# Patient Record
Sex: FEMALE | Race: WHITE | NOT HISPANIC OR LATINO | Employment: UNEMPLOYED | URBAN - METROPOLITAN AREA
[De-identification: names, ages, dates, MRNs, and addresses within clinical notes are randomized per-mention and may not be internally consistent; named-entity substitution may affect disease eponyms.]

---

## 2017-10-05 ENCOUNTER — APPOINTMENT (OUTPATIENT)
Dept: SOCIAL WORK | Facility: CLINIC | Age: 27
End: 2017-10-05

## 2017-10-05 PROCEDURE — 90673 RIV3 VACCINE NO PRESERV IM: CPT | Performed by: REGISTERED NURSE

## 2019-06-24 DIAGNOSIS — Z01.812 PRE-OPERATIVE LABORATORY EXAMINATION: ICD-10-CM

## 2019-06-24 DIAGNOSIS — Z01.810 PRE-OPERATIVE CARDIOVASCULAR EXAMINATION: ICD-10-CM

## 2019-06-24 LAB
ANION GAP SERPL CALC-SCNC: 7 MMOL/L (ref 0–11.9)
BUN SERPL-MCNC: 11 MG/DL (ref 8–22)
CALCIUM SERPL-MCNC: 8.8 MG/DL (ref 8.5–10.5)
CHLORIDE SERPL-SCNC: 112 MMOL/L (ref 96–112)
CO2 SERPL-SCNC: 21 MMOL/L (ref 20–33)
CREAT SERPL-MCNC: 0.79 MG/DL (ref 0.5–1.4)
ERYTHROCYTE [DISTWIDTH] IN BLOOD BY AUTOMATED COUNT: 39.8 FL (ref 35.9–50)
GLUCOSE SERPL-MCNC: 113 MG/DL (ref 65–99)
HCT VFR BLD AUTO: 41.3 % (ref 37–47)
HGB BLD-MCNC: 13.1 G/DL (ref 12–16)
MCH RBC QN AUTO: 26.5 PG (ref 27–33)
MCHC RBC AUTO-ENTMCNC: 31.7 G/DL (ref 33.6–35)
MCV RBC AUTO: 83.4 FL (ref 81.4–97.8)
PLATELET # BLD AUTO: 267 K/UL (ref 164–446)
PMV BLD AUTO: 10.5 FL (ref 9–12.9)
POTASSIUM SERPL-SCNC: 3.8 MMOL/L (ref 3.6–5.5)
RBC # BLD AUTO: 4.95 M/UL (ref 4.2–5.4)
SODIUM SERPL-SCNC: 140 MMOL/L (ref 135–145)
WBC # BLD AUTO: 6.6 K/UL (ref 4.8–10.8)

## 2019-06-24 PROCEDURE — 93010 ELECTROCARDIOGRAM REPORT: CPT | Performed by: INTERNAL MEDICINE

## 2019-06-24 PROCEDURE — 93005 ELECTROCARDIOGRAM TRACING: CPT

## 2019-06-24 PROCEDURE — 84703 CHORIONIC GONADOTROPIN ASSAY: CPT

## 2019-06-24 PROCEDURE — 85027 COMPLETE CBC AUTOMATED: CPT

## 2019-06-24 PROCEDURE — 80048 BASIC METABOLIC PNL TOTAL CA: CPT

## 2019-06-24 PROCEDURE — 36415 COLL VENOUS BLD VENIPUNCTURE: CPT

## 2019-06-24 RX ORDER — OMEPRAZOLE 20 MG/1
20 CAPSULE, DELAYED RELEASE ORAL EVERY MORNING
COMMUNITY

## 2019-06-24 RX ORDER — LOSARTAN POTASSIUM AND HYDROCHLOROTHIAZIDE 12.5; 5 MG/1; MG/1
1 TABLET ORAL EVERY MORNING
Status: ON HOLD | COMMUNITY
End: 2020-02-19

## 2019-06-24 RX ORDER — ATENOLOL 25 MG/1
25 TABLET ORAL
COMMUNITY

## 2019-06-24 RX ORDER — CETIRIZINE HYDROCHLORIDE 10 MG/1
10 TABLET ORAL
COMMUNITY

## 2019-06-24 NOTE — OR NURSING
"Pre-admit appointment completed. \"Preparing for your procedure\" sheet given to pt along with verbal and written instructions. Pt instructed to continue regularly prescribed medications through the day before surgery. Pt instructed to take the following medications the day of surgery with a sip of water, per anesthesia protocol; prilosec and topiramate.    Dr Lundberg notified via e-mail of procedure due to BMI=54.88 and other co-morbidities.  "

## 2019-06-25 LAB
EKG IMPRESSION: NORMAL
HCG SERPL QL: NEGATIVE

## 2019-06-25 NOTE — OR NURSING
Dr Lundberg's response to BMI notification;  Patient is at risk for perioperatively pulmonary issues given extreme BMI.  As always, it is up to the assigned anesthesiologist to decide whether to proceed or not in this case after reviewing the clinical picture.  Thank you.     Edilberto Lundberg M.D.  [facility]

## 2019-06-26 ENCOUNTER — ANESTHESIA (OUTPATIENT)
Dept: SURGERY | Facility: MEDICAL CENTER | Age: 29
End: 2019-06-26
Payer: MEDICAID

## 2019-06-26 ENCOUNTER — ANESTHESIA EVENT (OUTPATIENT)
Dept: SURGERY | Facility: MEDICAL CENTER | Age: 29
End: 2019-06-26
Payer: MEDICAID

## 2019-06-26 ENCOUNTER — HOSPITAL ENCOUNTER (OUTPATIENT)
Facility: MEDICAL CENTER | Age: 29
End: 2019-06-26
Attending: INTERNAL MEDICINE | Admitting: INTERNAL MEDICINE
Payer: MEDICAID

## 2019-06-26 VITALS
SYSTOLIC BLOOD PRESSURE: 115 MMHG | OXYGEN SATURATION: 98 % | HEART RATE: 82 BPM | RESPIRATION RATE: 16 BRPM | TEMPERATURE: 97.7 F | DIASTOLIC BLOOD PRESSURE: 65 MMHG | WEIGHT: 293 LBS | BODY MASS INDEX: 41.95 KG/M2 | HEIGHT: 70 IN

## 2019-06-26 LAB
HCG UR QL: NEGATIVE
PATHOLOGY CONSULT NOTE: NORMAL

## 2019-06-26 PROCEDURE — 160025 RECOVERY II MINUTES (STATS): Performed by: INTERNAL MEDICINE

## 2019-06-26 PROCEDURE — 160046 HCHG PACU - 1ST 60 MINS PHASE II: Performed by: INTERNAL MEDICINE

## 2019-06-26 PROCEDURE — 160002 HCHG RECOVERY MINUTES (STAT): Performed by: INTERNAL MEDICINE

## 2019-06-26 PROCEDURE — 700105 HCHG RX REV CODE 258: Performed by: INTERNAL MEDICINE

## 2019-06-26 PROCEDURE — 502240 HCHG MISC OR SUPPLY RC 0272: Performed by: INTERNAL MEDICINE

## 2019-06-26 PROCEDURE — 88305 TISSUE EXAM BY PATHOLOGIST: CPT | Mod: 59

## 2019-06-26 PROCEDURE — 81025 URINE PREGNANCY TEST: CPT

## 2019-06-26 PROCEDURE — 700101 HCHG RX REV CODE 250: Performed by: ANESTHESIOLOGY

## 2019-06-26 PROCEDURE — 700111 HCHG RX REV CODE 636 W/ 250 OVERRIDE (IP): Performed by: ANESTHESIOLOGY

## 2019-06-26 PROCEDURE — 160009 HCHG ANES TIME/MIN: Performed by: INTERNAL MEDICINE

## 2019-06-26 PROCEDURE — 160204 HCHG ENDO MINUTES - 1ST 30 MINS LEVEL 5: Performed by: INTERNAL MEDICINE

## 2019-06-26 PROCEDURE — 160048 HCHG OR STATISTICAL LEVEL 1-5: Performed by: INTERNAL MEDICINE

## 2019-06-26 PROCEDURE — 160035 HCHG PACU - 1ST 60 MINS PHASE I: Performed by: INTERNAL MEDICINE

## 2019-06-26 PROCEDURE — 503369 HCHG GOLDPROBE, 7 FR: Performed by: INTERNAL MEDICINE

## 2019-06-26 RX ORDER — HALOPERIDOL 5 MG/ML
1 INJECTION INTRAMUSCULAR
Status: DISCONTINUED | OUTPATIENT
Start: 2019-06-26 | End: 2019-06-26 | Stop reason: HOSPADM

## 2019-06-26 RX ORDER — HYDROMORPHONE HYDROCHLORIDE 1 MG/ML
0.4 INJECTION, SOLUTION INTRAMUSCULAR; INTRAVENOUS; SUBCUTANEOUS
Status: DISCONTINUED | OUTPATIENT
Start: 2019-06-26 | End: 2019-06-26 | Stop reason: HOSPADM

## 2019-06-26 RX ORDER — HYDROMORPHONE HYDROCHLORIDE 1 MG/ML
0.1 INJECTION, SOLUTION INTRAMUSCULAR; INTRAVENOUS; SUBCUTANEOUS
Status: DISCONTINUED | OUTPATIENT
Start: 2019-06-26 | End: 2019-06-26 | Stop reason: HOSPADM

## 2019-06-26 RX ORDER — MIDAZOLAM HYDROCHLORIDE 1 MG/ML
1 INJECTION INTRAMUSCULAR; INTRAVENOUS
Status: DISCONTINUED | OUTPATIENT
Start: 2019-06-26 | End: 2019-06-26 | Stop reason: HOSPADM

## 2019-06-26 RX ORDER — MEPERIDINE HYDROCHLORIDE 25 MG/ML
12.5 INJECTION INTRAMUSCULAR; INTRAVENOUS; SUBCUTANEOUS
Status: DISCONTINUED | OUTPATIENT
Start: 2019-06-26 | End: 2019-06-26 | Stop reason: HOSPADM

## 2019-06-26 RX ORDER — METOPROLOL TARTRATE 1 MG/ML
1 INJECTION, SOLUTION INTRAVENOUS
Status: DISCONTINUED | OUTPATIENT
Start: 2019-06-26 | End: 2019-06-26 | Stop reason: HOSPADM

## 2019-06-26 RX ORDER — SODIUM CHLORIDE, SODIUM LACTATE, POTASSIUM CHLORIDE, CALCIUM CHLORIDE 600; 310; 30; 20 MG/100ML; MG/100ML; MG/100ML; MG/100ML
INJECTION, SOLUTION INTRAVENOUS CONTINUOUS
Status: DISCONTINUED | OUTPATIENT
Start: 2019-06-26 | End: 2019-06-26 | Stop reason: HOSPADM

## 2019-06-26 RX ORDER — DIPHENHYDRAMINE HYDROCHLORIDE 50 MG/ML
12.5 INJECTION INTRAMUSCULAR; INTRAVENOUS
Status: DISCONTINUED | OUTPATIENT
Start: 2019-06-26 | End: 2019-06-26 | Stop reason: HOSPADM

## 2019-06-26 RX ORDER — HYDRALAZINE HYDROCHLORIDE 20 MG/ML
10 INJECTION INTRAMUSCULAR; INTRAVENOUS
Status: DISCONTINUED | OUTPATIENT
Start: 2019-06-26 | End: 2019-06-26 | Stop reason: HOSPADM

## 2019-06-26 RX ORDER — HYDROMORPHONE HYDROCHLORIDE 1 MG/ML
0.2 INJECTION, SOLUTION INTRAMUSCULAR; INTRAVENOUS; SUBCUTANEOUS
Status: DISCONTINUED | OUTPATIENT
Start: 2019-06-26 | End: 2019-06-26 | Stop reason: HOSPADM

## 2019-06-26 RX ADMIN — PROPOFOL 20 MG: 10 INJECTION, EMULSION INTRAVENOUS at 09:51

## 2019-06-26 RX ADMIN — SODIUM CHLORIDE, POTASSIUM CHLORIDE, SODIUM LACTATE AND CALCIUM CHLORIDE: 600; 310; 30; 20 INJECTION, SOLUTION INTRAVENOUS at 08:20

## 2019-06-26 RX ADMIN — PROPOFOL 40 MG: 10 INJECTION, EMULSION INTRAVENOUS at 09:41

## 2019-06-26 RX ADMIN — PROPOFOL 20 MG: 10 INJECTION, EMULSION INTRAVENOUS at 09:50

## 2019-06-26 RX ADMIN — PROPOFOL 20 MG: 10 INJECTION, EMULSION INTRAVENOUS at 09:49

## 2019-06-26 RX ADMIN — PROPOFOL 40 MG: 10 INJECTION, EMULSION INTRAVENOUS at 09:44

## 2019-06-26 RX ADMIN — MIDAZOLAM HYDROCHLORIDE 2 MG: 1 INJECTION, SOLUTION INTRAMUSCULAR; INTRAVENOUS at 09:29

## 2019-06-26 RX ADMIN — PROPOFOL 20 MG: 10 INJECTION, EMULSION INTRAVENOUS at 09:47

## 2019-06-26 RX ADMIN — LIDOCAINE HYDROCHLORIDE 100 MG: 20 INJECTION, SOLUTION INFILTRATION; PERINEURAL at 09:41

## 2019-06-26 RX ADMIN — FENTANYL CITRATE 100 MCG: 50 INJECTION, SOLUTION INTRAMUSCULAR; INTRAVENOUS at 09:40

## 2019-06-26 RX ADMIN — SODIUM CHLORIDE, POTASSIUM CHLORIDE, SODIUM LACTATE AND CALCIUM CHLORIDE: 600; 310; 30; 20 INJECTION, SOLUTION INTRAVENOUS at 09:40

## 2019-06-26 RX ADMIN — PROPOFOL 20 MG: 10 INJECTION, EMULSION INTRAVENOUS at 09:40

## 2019-06-26 ASSESSMENT — PAIN SCALES - GENERAL: PAIN_LEVEL: 0

## 2019-06-26 NOTE — DISCHARGE INSTRUCTIONS
COLONOSCOPY OR FLEXIBLE SIGMOIDOSCOPY  1. If you received a barium enema, take a mild laxative such as dulcolax to clean out the barium.   2. Drink plenty of fluids. Eat a diet high in fiber; such foods as whole-grain breads, fresh fruit and vegetables, nuts are recommended.  3. You may notice a few drops of blood with your first bowel movement. If you develop any large amount of bleeding, black stools, a fever, or abdominal pain, call your doctor right away.   4. Call your doctor for test results in 5 days.  5. Don't drive or drink alcohol for 24 hours. The medication you received will make you too drowsy.   6. No heavy lifting, ASA products or ASA x 5 days   7. Additional instructions: Biopsies taken, Hemorrhoids found during procedure. To follow up with Dr. Hernandez as previously noted  8. Prescriptions: none    Discharge time: 10:00 am to recovery room    You should call 911 if you develop problems with breathing or chest pain.    Nurse's Signature: ___________________________________    I acknowledge receipt and understanding of these Home Care instructions.      Colonoscopy, Adult  A colonoscopy is an exam to look at the large intestine. It is done to check for problems, such as:  · Lumps (tumors).  · Growths (polyps).  · Swelling (inflammation).  · Bleeding.  What happens before the procedure?  Eating and drinking  Follow instructions from your doctor about eating and drinking. These instructions may include:  · A few days before the procedure - follow a low-fiber diet.  ¨ Avoid nuts.  ¨ Avoid seeds.  ¨ Avoid dried fruit.  ¨ Avoid raw fruits.  ¨ Avoid vegetables.  · 1-3 days before the procedure - follow a clear liquid diet. Avoid liquids that have red or purple dye. Drink only clear liquids, such as:  ¨ Clear broth or bouillon.  ¨ Black coffee or tea.  ¨ Clear juice.  ¨ Clear soft drinks or sports drinks.  ¨ Gelatin desert.  ¨ Popsicles.  · On the day of the procedure - do not eat or drink anything during the  2 hours before the procedure.  Bowel prep  If you were prescribed an oral bowel prep:  · Take it as told by your doctor. Starting the day before your procedure, you will need to drink a lot of liquid. The liquid will cause you to poop (have bowel movements) until your poop is almost clear or light green.  · If your skin or butt gets irritated from diarrhea, you may:  ¨ Wipe the area with wipes that have medicine in them, such as adult wet wipes with aloe and vitamin E.  ¨ Put something on your skin that soothes the area, such as petroleum jelly.  · If you throw up (vomit) while drinking the bowel prep, take a break for up to 60 minutes. Then begin the bowel prep again. If you keep throwing up and you cannot take the bowel prep without throwing up, call your doctor.  General instructions  · Ask your doctor about changing or stopping your normal medicines. This is important if you take diabetes medicines or blood thinners.  · Plan to have someone take you home from the hospital or clinic.  What happens during the procedure?  · An IV tube may be put into one of your veins.  · You will be given medicine to help you relax (sedative).  · To reduce your risk of infection:  ¨ Your doctors will wash their hands.  ¨ Your anal area will be washed with soap.  · You will be asked to lie on your side with your knees bent.  · Your doctor will get a long, thin, flexible tube ready. The tube will have a camera and a light on the end.  · The tube will be put into your anus.  · The tube will be gently put into your large intestine.  · Air will be delivered into your large intestine to keep it open. You may feel some pressure or cramping.  · The camera will be used to take photos.  · A small tissue sample may be removed from your body to be looked at under a microscope (biopsy). If any possible problems are found, the tissue will be sent to a lab for testing.  · If small growths are found, your doctor may remove them and have them  checked for cancer.  · The tube that was put into your anus will be slowly removed.  The procedure may vary among doctors and hospitals.  What happens after the procedure?  · Your doctor will check on you often until the medicines you were given have worn off.  · Do not drive for 24 hours after the procedure.  · You may have a small amount of blood in your poop.  · You may pass gas.  · You may have mild cramps or bloating in your belly (abdomen).  · It is up to you to get the results of your procedure. Ask your doctor, or the department performing the procedure, when your results will be ready.  This information is not intended to replace advice given to you by your health care provider. Make sure you discuss any questions you have with your health care provider.  Document Released: 01/20/2012 Document Revised: 08/24/2017 Document Reviewed: 02/28/2017  Elsevier Interactive Patient Education © 2017 [employer]

## 2019-06-26 NOTE — PROCEDURES
Pre-procedure Diagnoses:   Change in bowel habits [R19.4]   Chronic constipation [K59.09]   Functional diarrhea [K59.1]     Post-procedure Diagnoses:   Grade I internal hemorrhoids [K64.0]     Procedures:   COLONOSCOPY WITH BIOPSIES [43728 (CPT®)]     Endoscopist: Be Gould MD, [facility], [facility] [place]    Monitored anesthesia care: Dr. Hood Childress    Consent: Risks, benefits, and alternatives of aforementioned procedures were discussed with patient in detal.  Consenting person was given opportunities to ask questions and discuss other options.  Risks including but not limited to perforation, infection, bleeding, missed lesion(s), possible need for surgery and/or interventional radiology, possible need for repeat procedures and/or additional testing, hospitalization possibly prolonged, cardiac and/or pulmonary event (I quoted patient a higher risk than usual due to morbid obesity), aspiration, hypoxia, stroke, medication and/or anesthesia reaction, indefinite diagnosis, discomfort, unsuccessful and/or incomplete procedure, ineffective therapy and/or persistent symptoms, damage to adjacent organs and/or vascular structures, and other adverse events possibly life-threatening.  Interactive discussion was undertaken with Layman's terms.  I answered questions in full and to satisfaction.  Consenting person stated understanding and acceptance of these risks, and wished to proceed.  Informed consent was given in clear state of mind.    Endoscopic procedures in detail:  Adult long-variable stiffness colonoscopy was inserted into anus and rectum, retroflexion was performed in rectum, colonoscope was advanced to cecum, appendiceal orifice and ileocecal valve were identified, terminal ileum was intubated and inspected, water flush was used to wash mucosa and suction of colonic fluid contents was performed to optimize visualization.  I performed photodocumentation of the terminal ileum, cecum, appendical orifice, ileocecal value,  retroflexion view in rectum and anus channel. Terminal ileum and random colon biopsies were obtained and sent to pathology in separate containers.  There was suction of insufflated air and fluid contents upon removal.     Procedure times:  - In-room 09:40  - Start 09:45  - Cecal: 09:46  - Completed 09:53  - Out of room per nursing records    Colonoscopy Findings:  - Bowel preparation: excellent  - Terminal ileum: endoscopically unremarkable.  - Colon: endoscopically unremarkable.  - Rectum: small non-bleeding grade 1 internal hemorrhoids.    Impression:  1. Internal hemorrhoids, grade 1  2. Terminal ileum and random colon biopsies obtained.    Recommendations:   1.  Routine post-endoscopy anesthesia recovery care.  Discharge patient when she is awake, alert and comfortable, when recovery criteria are met.  Aspiration and fall precautions x 24 hours.   2. High-fiber diet.  3. Follow-up with established ARLET Hernandez NP as scheduled 7/3/2019.  4. I spoke to patient, father (Vivek) and recovery nurse about impression, diagnosis and recommendations.

## 2019-06-26 NOTE — ANESTHESIA PREPROCEDURE EVALUATION
Relevant Problems   (+) AR (allergic rhinitis)   (+) Anxiety   (+) BMI 50.0-59.9, adult (HCC)   (+) Hypertension   (+) Migraine headache   (+) Sacral fracture (HCC)   (+) Thoracic vertebral fracture (HCC)       Physical Exam    Airway   Mallampati: II  TM distance: >3 FB  Neck ROM: full       Cardiovascular - normal exam  Rhythm: regular  Rate: normal  (-) murmur     Dental - normal exam         Pulmonary - normal exam  Breath sounds clear to auscultation     Abdominal    Neurological - normal exam                 Anesthesia Plan    ASA 3   ASA physical status 3 criteria: morbid obesity - BMI greater than or equal to 40    Plan - MAC             Induction: intravenous      Pertinent diagnostic labs and testing reviewed    Informed Consent:    Anesthetic plan and risks discussed with patient.    Use of blood products discussed with: patient whom consented to blood products.

## 2019-06-26 NOTE — OR NURSING
0808 into pre op educated on pre op procedures and events for this day's schedule, pt unable to urinate at this time.   0840 up to void attempt, father in to sit with pt after she voids

## 2019-06-26 NOTE — ANESTHESIA POSTPROCEDURE EVALUATION
Patient: Rina Maher    Procedure Summary     Date:  06/26/19 Room / Location:   ENDOSCOPIC ULTRASOUND ROOM / SURGERY [facility]    Anesthesia Start:  0940 Anesthesia Stop:  1001    Procedure:  COLONOSCOPY Diagnosis:       Change in bowel habits      (CHANGE IN BOWEL HABITS)    Surgeon:  Be Gould M.D. Responsible Provider:  Hood Childress M.D.    Anesthesia Type:  MAC ASA Status:  3          Final Anesthesia Type: MAC  Last vitals  BP   Blood Pressure: 136/90    Temp        Pulse   Pulse: 82   Resp   18    SpO2   98 %      Anesthesia Post Evaluation    Patient location during evaluation: PACU  Patient participation: complete - patient participated  Level of consciousness: awake and alert  Pain score: 0    Airway patency: patent  Anesthetic complications: no  Cardiovascular status: hemodynamically stable  Respiratory status: acceptable  Hydration status: euvolemic    PONV: none           Nurse Pain Score: 0 (NPRS)

## 2019-06-26 NOTE — PROGRESS NOTES
Patient seen and examined but proceeding with colonoscopy.  Risks, benefits, and alternatives of aforementioned procedures were discussed with patient in detail.  Consenting person was given opportunities to ask questions and discuss other options.  Risks including but not limited to perforation, infection, bleeding, missed lesion(s), possible need for surgery and/or interventional radiology, possible need for repeat procedures and/or additional testing, hospitalization possibly prolonged, cardiac and/or pulmonary event, aspiration, hypoxia, stroke, medication and/or anesthesia reaction, indefinite diagnosis, discomfort, unsuccessful and/or incomplete procedure, ineffective therapy and/or persistent symptoms, damage to adjacent organs and/or vascular structures, and other adverse events possibly life-threatening.  Interactive discussion was undertaken with Layman's terms.  I answered questions in full and to satisfaction.  Consenting person stated understanding and acceptance of these risks, and wished to proceed.  Informed consent was given in clear state of mind.

## 2019-06-26 NOTE — ANESTHESIA QCDR
2019 Qualified Clinical Data Registry (for Quality Improvement)     Postoperative nausea/vomiting risk protocol (Adult = 18 yrs and Pediatric 3-17 yrs)- (430 and 463)  General inhalation anesthetic (NOT TIVA) with PONV risk factors: No  Provision of anti-emetic therapy with at least 2 different classes of agents: N/A  Patient DID NOT receive anti-emetic therapy and reason is documented in Medical Record: N/A    Multimodal Pain Management- (AQI59)  Patient undergoing Elective Surgery (i.e. Outpatient, or ASC, or Prescheduled Surgery prior to Hospital Admission): No  Use of Multimodal Pain Management, two or more drugs and/or interventions, NOT including systemic opioids: N/A  Exception: Documented allergy to multiple classes of analgesics: N/A    PACU assessment of acute postoperative pain prior to Anesthesia Care End- Applies to Patients Age = 18- (ABG7)  Initial PACU pain score is which of the following: < 7/10  Patient unable to report pain score: N/A    Post-anesthetic transfer of care checklist/protocol to PACU/ICU- (426 and 427)  Upon conclusion of case, patient transferred to which of the following locations:   Use of transfer checklist/protocol: Yes  Exclusion: Service Performed in Patient Hospital Room (and thus did not require transfer): N/A    PACU Reintubation- (AQI31)  General anesthesia requiring endotracheal intubation (ETT) along with subsequent extubation in OR or PACU: No  Required reintubation in the PACU: N/A  Extubation was a planned trial documented in the medical record prior to removal of the original airway device: N/A    Unplanned admission to ICU related to anesthesia service up through end of PACU care- (MD51)  Unplanned admission to ICU (not initially anticipated at anesthesia start time): No

## 2019-06-26 NOTE — ANESTHESIA TIME REPORT
Anesthesia Start and Stop Event Times     Date Time Event    6/26/2019 0940 Anesthesia Start     1001 Anesthesia Stop        Responsible Staff  06/26/19    Name Role Begin End    Hood Childress M.D. Anesth 0940 1001        Preop Diagnosis (Free Text):  Pre-op Diagnosis     CHANGE IN BOWEL HABITS        Preop Diagnosis (Codes):  Diagnosis Information     Diagnosis Code(s): Change in bowel habits [R19.4]        Post op Diagnosis  Encounter for screening colonoscopy      Premium Reason  Non-Premium    Comments:

## 2019-06-26 NOTE — OR NURSING
1000- Patient admitted to PACU from the recovery room. No distress noted at this time. Patient resting in bed. Respirations noted as even and unlabored. Patient resting in bed.     1015- Patient assisted with repositioning in bed. Patient given lemon lime soda per patient request. Patient denies pain or nausea. Patient states of discomfort.     1030- Patient resting in bed and denies the need for pain or nausea intervention at this time.     1043- Report called to stage II area. Patient ready for transfer.

## 2019-08-27 ENCOUNTER — HOSPITAL ENCOUNTER (OUTPATIENT)
Dept: RADIOLOGY | Facility: MEDICAL CENTER | Age: 29
End: 2019-08-27
Attending: FAMILY MEDICINE
Payer: MEDICAID

## 2019-08-27 DIAGNOSIS — M54.5 LOW BACK PAIN, UNSPECIFIED BACK PAIN LATERALITY, UNSPECIFIED CHRONICITY, WITH SCIATICA PRESENCE UNSPECIFIED: ICD-10-CM

## 2019-08-27 DIAGNOSIS — M54.16 LUMBAR RADICULOPATHY: ICD-10-CM

## 2019-08-27 PROCEDURE — 72148 MRI LUMBAR SPINE W/O DYE: CPT

## 2020-02-13 ENCOUNTER — HOSPITAL ENCOUNTER (OUTPATIENT)
Dept: RADIOLOGY | Facility: MEDICAL CENTER | Age: 30
DRG: 621 | End: 2020-02-13
Attending: COLON & RECTAL SURGERY
Payer: MEDICAID

## 2020-02-13 DIAGNOSIS — Z01.811 PRE-OPERATIVE RESPIRATORY EXAMINATION: ICD-10-CM

## 2020-02-13 DIAGNOSIS — Z01.812 PRE-OPERATIVE LABORATORY EXAMINATION: ICD-10-CM

## 2020-02-13 DIAGNOSIS — Z01.810 PRE-OPERATIVE CARDIOVASCULAR EXAMINATION: ICD-10-CM

## 2020-02-13 LAB
ALBUMIN SERPL BCP-MCNC: 4.4 G/DL (ref 3.2–4.9)
ALBUMIN/GLOB SERPL: 1.3 G/DL
ALP SERPL-CCNC: 69 U/L (ref 30–99)
ALT SERPL-CCNC: 88 U/L (ref 2–50)
ANION GAP SERPL CALC-SCNC: 12 MMOL/L (ref 0–11.9)
AST SERPL-CCNC: 38 U/L (ref 12–45)
BASOPHILS # BLD AUTO: 0.4 % (ref 0–1.8)
BASOPHILS # BLD: 0.03 K/UL (ref 0–0.12)
BILIRUB SERPL-MCNC: 0.5 MG/DL (ref 0.1–1.5)
BUN SERPL-MCNC: 17 MG/DL (ref 8–22)
CALCIUM SERPL-MCNC: 9.6 MG/DL (ref 8.5–10.5)
CHLORIDE SERPL-SCNC: 108 MMOL/L (ref 96–112)
CO2 SERPL-SCNC: 20 MMOL/L (ref 20–33)
CREAT SERPL-MCNC: 0.77 MG/DL (ref 0.5–1.4)
EKG IMPRESSION: NORMAL
EOSINOPHIL # BLD AUTO: 0.08 K/UL (ref 0–0.51)
EOSINOPHIL NFR BLD: 1 % (ref 0–6.9)
ERYTHROCYTE [DISTWIDTH] IN BLOOD BY AUTOMATED COUNT: 38.7 FL (ref 35.9–50)
EST. AVERAGE GLUCOSE BLD GHB EST-MCNC: 100 MG/DL
GLOBULIN SER CALC-MCNC: 3.3 G/DL (ref 1.9–3.5)
GLUCOSE SERPL-MCNC: 119 MG/DL (ref 65–99)
HBA1C MFR BLD: 5.1 % (ref 0–5.6)
HCT VFR BLD AUTO: 44.6 % (ref 37–47)
HGB BLD-MCNC: 15.3 G/DL (ref 12–16)
IMM GRANULOCYTES # BLD AUTO: 0.03 K/UL (ref 0–0.11)
IMM GRANULOCYTES NFR BLD AUTO: 0.4 % (ref 0–0.9)
INR PPP: 0.98 (ref 0.87–1.13)
LYMPHOCYTES # BLD AUTO: 1.64 K/UL (ref 1–4.8)
LYMPHOCYTES NFR BLD: 21.5 % (ref 22–41)
MCH RBC QN AUTO: 28.3 PG (ref 27–33)
MCHC RBC AUTO-ENTMCNC: 34.3 G/DL (ref 33.6–35)
MCV RBC AUTO: 82.6 FL (ref 81.4–97.8)
MONOCYTES # BLD AUTO: 0.65 K/UL (ref 0–0.85)
MONOCYTES NFR BLD AUTO: 8.5 % (ref 0–13.4)
NEUTROPHILS # BLD AUTO: 5.21 K/UL (ref 2–7.15)
NEUTROPHILS NFR BLD: 68.2 % (ref 44–72)
NRBC # BLD AUTO: 0 K/UL
NRBC BLD-RTO: 0 /100 WBC
PLATELET # BLD AUTO: 296 K/UL (ref 164–446)
PMV BLD AUTO: 10.8 FL (ref 9–12.9)
POTASSIUM SERPL-SCNC: 3.3 MMOL/L (ref 3.6–5.5)
PROT SERPL-MCNC: 7.7 G/DL (ref 6–8.2)
PROTHROMBIN TIME: 13.2 SEC (ref 12–14.6)
RBC # BLD AUTO: 5.4 M/UL (ref 4.2–5.4)
SODIUM SERPL-SCNC: 140 MMOL/L (ref 135–145)
WBC # BLD AUTO: 7.6 K/UL (ref 4.8–10.8)

## 2020-02-13 PROCEDURE — 85025 COMPLETE CBC W/AUTO DIFF WBC: CPT

## 2020-02-13 PROCEDURE — 93005 ELECTROCARDIOGRAM TRACING: CPT

## 2020-02-13 PROCEDURE — 36415 COLL VENOUS BLD VENIPUNCTURE: CPT

## 2020-02-13 PROCEDURE — 80053 COMPREHEN METABOLIC PANEL: CPT

## 2020-02-13 PROCEDURE — 83036 HEMOGLOBIN GLYCOSYLATED A1C: CPT

## 2020-02-13 PROCEDURE — 93010 ELECTROCARDIOGRAM REPORT: CPT | Performed by: INTERNAL MEDICINE

## 2020-02-13 PROCEDURE — 71045 X-RAY EXAM CHEST 1 VIEW: CPT

## 2020-02-13 PROCEDURE — 85610 PROTHROMBIN TIME: CPT

## 2020-02-13 RX ORDER — SERTRALINE HYDROCHLORIDE 100 MG/1
200 TABLET, FILM COATED ORAL
COMMUNITY

## 2020-02-13 RX ORDER — TOPIRAMATE 100 MG/1
50-100 CAPSULE, EXTENDED RELEASE ORAL 2 TIMES DAILY
Status: ON HOLD | COMMUNITY
End: 2020-02-19

## 2020-02-13 RX ORDER — FLUTICASONE PROPIONATE 50 MCG
1 SPRAY, SUSPENSION (ML) NASAL
COMMUNITY

## 2020-02-19 ENCOUNTER — HOSPITAL ENCOUNTER (INPATIENT)
Facility: MEDICAL CENTER | Age: 30
LOS: 1 days | DRG: 621 | End: 2020-02-20
Attending: COLON & RECTAL SURGERY | Admitting: COLON & RECTAL SURGERY
Payer: MEDICAID

## 2020-02-19 ENCOUNTER — ANESTHESIA EVENT (OUTPATIENT)
Dept: SURGERY | Facility: MEDICAL CENTER | Age: 30
DRG: 621 | End: 2020-02-19
Payer: MEDICAID

## 2020-02-19 ENCOUNTER — ANESTHESIA (OUTPATIENT)
Dept: SURGERY | Facility: MEDICAL CENTER | Age: 30
DRG: 621 | End: 2020-02-19
Payer: MEDICAID

## 2020-02-19 LAB
HCG SERPL QL: NEGATIVE
POTASSIUM SERPL-SCNC: 3.2 MMOL/L (ref 3.6–5.5)

## 2020-02-19 PROCEDURE — 501583 HCHG TROCAR, THRD CAN&SEAL 5X100: Performed by: COLON & RECTAL SURGERY

## 2020-02-19 PROCEDURE — 500448 HCHG DRESSING, TELFA 3X4: Performed by: COLON & RECTAL SURGERY

## 2020-02-19 PROCEDURE — 700101 HCHG RX REV CODE 250: Performed by: ANESTHESIOLOGY

## 2020-02-19 PROCEDURE — A9270 NON-COVERED ITEM OR SERVICE: HCPCS | Performed by: NURSE PRACTITIONER

## 2020-02-19 PROCEDURE — 160048 HCHG OR STATISTICAL LEVEL 1-5: Performed by: COLON & RECTAL SURGERY

## 2020-02-19 PROCEDURE — 84132 ASSAY OF SERUM POTASSIUM: CPT

## 2020-02-19 PROCEDURE — 700111 HCHG RX REV CODE 636 W/ 250 OVERRIDE (IP): Performed by: ANESTHESIOLOGY

## 2020-02-19 PROCEDURE — 160035 HCHG PACU - 1ST 60 MINS PHASE I: Performed by: COLON & RECTAL SURGERY

## 2020-02-19 PROCEDURE — 502570 HCHG PACK, GASTRIC BANDING: Performed by: COLON & RECTAL SURGERY

## 2020-02-19 PROCEDURE — 501399 HCHG SPECIMAN BAG, ENDO CATC: Performed by: COLON & RECTAL SURGERY

## 2020-02-19 PROCEDURE — 88307 TISSUE EXAM BY PATHOLOGIST: CPT

## 2020-02-19 PROCEDURE — 501338 HCHG SHEARS, ENDO: Performed by: COLON & RECTAL SURGERY

## 2020-02-19 PROCEDURE — 0DB64Z3 EXCISION OF STOMACH, PERCUTANEOUS ENDOSCOPIC APPROACH, VERTICAL: ICD-10-PCS | Performed by: COLON & RECTAL SURGERY

## 2020-02-19 PROCEDURE — 160002 HCHG RECOVERY MINUTES (STAT): Performed by: COLON & RECTAL SURGERY

## 2020-02-19 PROCEDURE — 770006 HCHG ROOM/CARE - MED/SURG/GYN SEMI*

## 2020-02-19 PROCEDURE — 700102 HCHG RX REV CODE 250 W/ 637 OVERRIDE(OP): Performed by: COLON & RECTAL SURGERY

## 2020-02-19 PROCEDURE — 700111 HCHG RX REV CODE 636 W/ 250 OVERRIDE (IP): Performed by: COLON & RECTAL SURGERY

## 2020-02-19 PROCEDURE — 0FB24ZX EXCISION OF LEFT LOBE LIVER, PERCUTANEOUS ENDOSCOPIC APPROACH, DIAGNOSTIC: ICD-10-PCS | Performed by: COLON & RECTAL SURGERY

## 2020-02-19 PROCEDURE — A9270 NON-COVERED ITEM OR SERVICE: HCPCS | Performed by: COLON & RECTAL SURGERY

## 2020-02-19 PROCEDURE — 88313 SPECIAL STAINS GROUP 2: CPT

## 2020-02-19 PROCEDURE — 160041 HCHG SURGERY MINUTES - EA ADDL 1 MIN LEVEL 4: Performed by: COLON & RECTAL SURGERY

## 2020-02-19 PROCEDURE — 700102 HCHG RX REV CODE 250 W/ 637 OVERRIDE(OP): Performed by: NURSE PRACTITIONER

## 2020-02-19 PROCEDURE — 700111 HCHG RX REV CODE 636 W/ 250 OVERRIDE (IP): Performed by: NURSE PRACTITIONER

## 2020-02-19 PROCEDURE — 700105 HCHG RX REV CODE 258: Performed by: NURSE PRACTITIONER

## 2020-02-19 PROCEDURE — 88305 TISSUE EXAM BY PATHOLOGIST: CPT

## 2020-02-19 PROCEDURE — 84703 CHORIONIC GONADOTROPIN ASSAY: CPT

## 2020-02-19 PROCEDURE — 160036 HCHG PACU - EA ADDL 30 MINS PHASE I: Performed by: COLON & RECTAL SURGERY

## 2020-02-19 PROCEDURE — 700111 HCHG RX REV CODE 636 W/ 250 OVERRIDE (IP)

## 2020-02-19 PROCEDURE — 700101 HCHG RX REV CODE 250: Performed by: COLON & RECTAL SURGERY

## 2020-02-19 PROCEDURE — 160009 HCHG ANES TIME/MIN: Performed by: COLON & RECTAL SURGERY

## 2020-02-19 PROCEDURE — 160029 HCHG SURGERY MINUTES - 1ST 30 MINS LEVEL 4: Performed by: COLON & RECTAL SURGERY

## 2020-02-19 PROCEDURE — 501838 HCHG SUTURE GENERAL: Performed by: COLON & RECTAL SURGERY

## 2020-02-19 PROCEDURE — 501497 HCHG SURGICLIP: Performed by: COLON & RECTAL SURGERY

## 2020-02-19 PROCEDURE — 700105 HCHG RX REV CODE 258: Performed by: COLON & RECTAL SURGERY

## 2020-02-19 PROCEDURE — 501570 HCHG TROCAR, SEPARATOR: Performed by: COLON & RECTAL SURGERY

## 2020-02-19 PROCEDURE — 502240 HCHG MISC OR SUPPLY RC 0272: Performed by: COLON & RECTAL SURGERY

## 2020-02-19 RX ORDER — PROMETHAZINE HYDROCHLORIDE 12.5 MG/1
TABLET ORAL
COMMUNITY
Start: 2020-02-12 | End: 2023-06-20

## 2020-02-19 RX ORDER — TOPIRAMATE 25 MG/1
50 TABLET ORAL EVERY EVENING
Status: DISCONTINUED | OUTPATIENT
Start: 2020-02-20 | End: 2020-02-20 | Stop reason: HOSPADM

## 2020-02-19 RX ORDER — TOPIRAMATE 100 MG/1
100 TABLET, FILM COATED ORAL EVERY MORNING
Status: DISCONTINUED | OUTPATIENT
Start: 2020-02-20 | End: 2020-02-20 | Stop reason: HOSPADM

## 2020-02-19 RX ORDER — LIDOCAINE HYDROCHLORIDE 10 MG/ML
INJECTION, SOLUTION EPIDURAL; INFILTRATION; INTRACAUDAL; PERINEURAL
Status: DISPENSED
Start: 2020-02-19 | End: 2020-02-20

## 2020-02-19 RX ORDER — GABAPENTIN 300 MG/1
300 CAPSULE ORAL ONCE
Status: DISCONTINUED | OUTPATIENT
Start: 2020-02-19 | End: 2020-02-19 | Stop reason: HOSPADM

## 2020-02-19 RX ORDER — CETIRIZINE HYDROCHLORIDE 10 MG/1
10 TABLET ORAL
Status: DISCONTINUED | OUTPATIENT
Start: 2020-02-19 | End: 2020-02-20 | Stop reason: HOSPADM

## 2020-02-19 RX ORDER — LOSARTAN POTASSIUM 50 MG/1
50 TABLET ORAL DAILY
COMMUNITY
Start: 2020-01-31 | End: 2023-06-20

## 2020-02-19 RX ORDER — OXYCODONE HCL 5 MG/5 ML
5 SOLUTION, ORAL ORAL
Status: DISCONTINUED | OUTPATIENT
Start: 2020-02-19 | End: 2020-02-20 | Stop reason: HOSPADM

## 2020-02-19 RX ORDER — OXYCODONE HCL 5 MG/5 ML
10 SOLUTION, ORAL ORAL
Status: DISCONTINUED | OUTPATIENT
Start: 2020-02-19 | End: 2020-02-19 | Stop reason: HOSPADM

## 2020-02-19 RX ORDER — OMEPRAZOLE 20 MG/1
20 CAPSULE, DELAYED RELEASE ORAL EVERY MORNING
Status: DISCONTINUED | OUTPATIENT
Start: 2020-02-20 | End: 2020-02-20 | Stop reason: HOSPADM

## 2020-02-19 RX ORDER — HYDRALAZINE HYDROCHLORIDE 20 MG/ML
5 INJECTION INTRAMUSCULAR; INTRAVENOUS
Status: DISCONTINUED | OUTPATIENT
Start: 2020-02-19 | End: 2020-02-19 | Stop reason: HOSPADM

## 2020-02-19 RX ORDER — SCOLOPAMINE TRANSDERMAL SYSTEM 1 MG/1
1 PATCH, EXTENDED RELEASE TRANSDERMAL
COMMUNITY
End: 2023-06-20

## 2020-02-19 RX ORDER — ATENOLOL 50 MG/1
25 TABLET ORAL
Status: DISCONTINUED | OUTPATIENT
Start: 2020-02-19 | End: 2020-02-20 | Stop reason: HOSPADM

## 2020-02-19 RX ORDER — LORAZEPAM 2 MG/ML
0.5 INJECTION INTRAMUSCULAR EVERY 4 HOURS PRN
Status: DISCONTINUED | OUTPATIENT
Start: 2020-02-19 | End: 2020-02-20 | Stop reason: HOSPADM

## 2020-02-19 RX ORDER — LOSARTAN POTASSIUM 50 MG/1
50 TABLET ORAL DAILY
Status: DISCONTINUED | OUTPATIENT
Start: 2020-02-20 | End: 2020-02-20 | Stop reason: HOSPADM

## 2020-02-19 RX ORDER — FLUTICASONE PROPIONATE 50 MCG
1 SPRAY, SUSPENSION (ML) NASAL
Status: DISCONTINUED | OUTPATIENT
Start: 2020-02-19 | End: 2020-02-20 | Stop reason: HOSPADM

## 2020-02-19 RX ORDER — CALCIUM CARBONATE 500 MG/1
500 TABLET, CHEWABLE ORAL
Status: DISCONTINUED | OUTPATIENT
Start: 2020-02-19 | End: 2020-02-20 | Stop reason: HOSPADM

## 2020-02-19 RX ORDER — HYDROCHLOROTHIAZIDE 12.5 MG/1
12.5 TABLET ORAL DAILY
Status: DISCONTINUED | OUTPATIENT
Start: 2020-02-20 | End: 2020-02-20 | Stop reason: HOSPADM

## 2020-02-19 RX ORDER — SIMETHICONE 80 MG
80 TABLET,CHEWABLE ORAL 3 TIMES DAILY PRN
Status: DISCONTINUED | OUTPATIENT
Start: 2020-02-19 | End: 2020-02-20 | Stop reason: HOSPADM

## 2020-02-19 RX ORDER — ACETAMINOPHEN 10 MG/ML
1000 INJECTION, SOLUTION INTRAVENOUS EVERY 6 HOURS
Status: COMPLETED | OUTPATIENT
Start: 2020-02-19 | End: 2020-02-20

## 2020-02-19 RX ORDER — HALOPERIDOL 5 MG/ML
1 INJECTION INTRAMUSCULAR
Status: DISCONTINUED | OUTPATIENT
Start: 2020-02-19 | End: 2020-02-19 | Stop reason: HOSPADM

## 2020-02-19 RX ORDER — ENALAPRILAT 1.25 MG/ML
2.5 INJECTION INTRAVENOUS EVERY 6 HOURS PRN
Status: DISCONTINUED | OUTPATIENT
Start: 2020-02-19 | End: 2020-02-20 | Stop reason: HOSPADM

## 2020-02-19 RX ORDER — ACETAMINOPHEN 500 MG
1000 TABLET ORAL EVERY 6 HOURS PRN
Status: DISCONTINUED | OUTPATIENT
Start: 2020-02-20 | End: 2020-02-20 | Stop reason: HOSPADM

## 2020-02-19 RX ORDER — BUPIVACAINE HYDROCHLORIDE AND EPINEPHRINE 5; 5 MG/ML; UG/ML
INJECTION, SOLUTION PERINEURAL
Status: DISCONTINUED | OUTPATIENT
Start: 2020-02-19 | End: 2020-02-19 | Stop reason: HOSPADM

## 2020-02-19 RX ORDER — HALOPERIDOL 5 MG/ML
1 INJECTION INTRAMUSCULAR EVERY 6 HOURS PRN
Status: DISCONTINUED | OUTPATIENT
Start: 2020-02-19 | End: 2020-02-20 | Stop reason: HOSPADM

## 2020-02-19 RX ORDER — DIPHENHYDRAMINE HYDROCHLORIDE 50 MG/ML
12.5 INJECTION INTRAMUSCULAR; INTRAVENOUS EVERY 6 HOURS PRN
Status: DISCONTINUED | OUTPATIENT
Start: 2020-02-19 | End: 2020-02-20 | Stop reason: HOSPADM

## 2020-02-19 RX ORDER — SODIUM CHLORIDE, SODIUM LACTATE, POTASSIUM CHLORIDE, AND CALCIUM CHLORIDE .6; .31; .03; .02 G/100ML; G/100ML; G/100ML; G/100ML
500 INJECTION, SOLUTION INTRAVENOUS
Status: DISCONTINUED | OUTPATIENT
Start: 2020-02-19 | End: 2020-02-20 | Stop reason: HOSPADM

## 2020-02-19 RX ORDER — PROMETHAZINE HYDROCHLORIDE 25 MG/1
25 SUPPOSITORY RECTAL EVERY 4 HOURS PRN
Status: DISCONTINUED | OUTPATIENT
Start: 2020-02-19 | End: 2020-02-20 | Stop reason: HOSPADM

## 2020-02-19 RX ORDER — LABETALOL HYDROCHLORIDE 5 MG/ML
INJECTION, SOLUTION INTRAVENOUS PRN
Status: DISCONTINUED | OUTPATIENT
Start: 2020-02-19 | End: 2020-02-19 | Stop reason: SURG

## 2020-02-19 RX ORDER — OXYCODONE HCL 5 MG/5 ML
5 SOLUTION, ORAL ORAL
Status: DISCONTINUED | OUTPATIENT
Start: 2020-02-19 | End: 2020-02-19 | Stop reason: HOSPADM

## 2020-02-19 RX ORDER — MIDAZOLAM HYDROCHLORIDE 1 MG/ML
1 INJECTION INTRAMUSCULAR; INTRAVENOUS
Status: DISCONTINUED | OUTPATIENT
Start: 2020-02-19 | End: 2020-02-19 | Stop reason: HOSPADM

## 2020-02-19 RX ORDER — DIPHENHYDRAMINE HYDROCHLORIDE 50 MG/ML
12.5 INJECTION INTRAMUSCULAR; INTRAVENOUS
Status: DISCONTINUED | OUTPATIENT
Start: 2020-02-19 | End: 2020-02-19 | Stop reason: HOSPADM

## 2020-02-19 RX ORDER — SODIUM CHLORIDE, SODIUM LACTATE, POTASSIUM CHLORIDE, CALCIUM CHLORIDE 600; 310; 30; 20 MG/100ML; MG/100ML; MG/100ML; MG/100ML
INJECTION, SOLUTION INTRAVENOUS CONTINUOUS
Status: DISCONTINUED | OUTPATIENT
Start: 2020-02-19 | End: 2020-02-19 | Stop reason: HOSPADM

## 2020-02-19 RX ORDER — HYDROMORPHONE HYDROCHLORIDE 1 MG/ML
0.5 INJECTION, SOLUTION INTRAMUSCULAR; INTRAVENOUS; SUBCUTANEOUS
Status: DISCONTINUED | OUTPATIENT
Start: 2020-02-19 | End: 2020-02-20 | Stop reason: HOSPADM

## 2020-02-19 RX ORDER — SERTRALINE HYDROCHLORIDE 100 MG/1
100 TABLET, FILM COATED ORAL
Status: DISCONTINUED | OUTPATIENT
Start: 2020-02-19 | End: 2020-02-20 | Stop reason: HOSPADM

## 2020-02-19 RX ORDER — METOCLOPRAMIDE HYDROCHLORIDE 5 MG/ML
10 INJECTION INTRAMUSCULAR; INTRAVENOUS EVERY 6 HOURS PRN
Status: DISCONTINUED | OUTPATIENT
Start: 2020-02-19 | End: 2020-02-20 | Stop reason: HOSPADM

## 2020-02-19 RX ORDER — OXYCODONE HCL 5 MG/5 ML
10 SOLUTION, ORAL ORAL
Status: DISCONTINUED | OUTPATIENT
Start: 2020-02-19 | End: 2020-02-20 | Stop reason: HOSPADM

## 2020-02-19 RX ORDER — TOPIRAMATE 100 MG/1
50-100 TABLET, FILM COATED ORAL 2 TIMES DAILY
COMMUNITY
Start: 2020-02-03

## 2020-02-19 RX ORDER — DEXAMETHASONE SODIUM PHOSPHATE 4 MG/ML
INJECTION, SOLUTION INTRA-ARTICULAR; INTRALESIONAL; INTRAMUSCULAR; INTRAVENOUS; SOFT TISSUE PRN
Status: DISCONTINUED | OUTPATIENT
Start: 2020-02-19 | End: 2020-02-19 | Stop reason: SURG

## 2020-02-19 RX ORDER — ACETAMINOPHEN 10 MG/ML
1 INJECTION, SOLUTION INTRAVENOUS ONCE
Status: COMPLETED | OUTPATIENT
Start: 2020-02-19 | End: 2020-02-19

## 2020-02-19 RX ORDER — SODIUM CHLORIDE, SODIUM LACTATE, POTASSIUM CHLORIDE, CALCIUM CHLORIDE 600; 310; 30; 20 MG/100ML; MG/100ML; MG/100ML; MG/100ML
INJECTION, SOLUTION INTRAVENOUS CONTINUOUS
Status: ACTIVE | OUTPATIENT
Start: 2020-02-19 | End: 2020-02-20

## 2020-02-19 RX ORDER — OXYCODONE HCL 5 MG/5 ML
SOLUTION, ORAL ORAL
COMMUNITY
Start: 2020-02-12 | End: 2023-06-20

## 2020-02-19 RX ORDER — SCOLOPAMINE TRANSDERMAL SYSTEM 1 MG/1
1 PATCH, EXTENDED RELEASE TRANSDERMAL
Status: DISCONTINUED | OUTPATIENT
Start: 2020-02-19 | End: 2020-02-19 | Stop reason: HOSPADM

## 2020-02-19 RX ORDER — OXYCODONE HCL 10 MG/1
10 TABLET, FILM COATED, EXTENDED RELEASE ORAL ONCE
Status: COMPLETED | OUTPATIENT
Start: 2020-02-19 | End: 2020-02-19

## 2020-02-19 RX ORDER — LABETALOL HYDROCHLORIDE 5 MG/ML
5 INJECTION, SOLUTION INTRAVENOUS
Status: DISCONTINUED | OUTPATIENT
Start: 2020-02-19 | End: 2020-02-19 | Stop reason: HOSPADM

## 2020-02-19 RX ORDER — ONDANSETRON 2 MG/ML
INJECTION INTRAMUSCULAR; INTRAVENOUS PRN
Status: DISCONTINUED | OUTPATIENT
Start: 2020-02-19 | End: 2020-02-19 | Stop reason: SURG

## 2020-02-19 RX ORDER — MEPERIDINE HYDROCHLORIDE 25 MG/ML
6.25 INJECTION INTRAMUSCULAR; INTRAVENOUS; SUBCUTANEOUS
Status: DISCONTINUED | OUTPATIENT
Start: 2020-02-19 | End: 2020-02-19 | Stop reason: HOSPADM

## 2020-02-19 RX ORDER — CEFOTETAN DISODIUM 2 G/20ML
INJECTION, POWDER, FOR SOLUTION INTRAMUSCULAR; INTRAVENOUS PRN
Status: DISCONTINUED | OUTPATIENT
Start: 2020-02-19 | End: 2020-02-19 | Stop reason: SURG

## 2020-02-19 RX ORDER — METOPROLOL TARTRATE 1 MG/ML
1 INJECTION, SOLUTION INTRAVENOUS
Status: DISCONTINUED | OUTPATIENT
Start: 2020-02-19 | End: 2020-02-19 | Stop reason: HOSPADM

## 2020-02-19 RX ORDER — ONDANSETRON 2 MG/ML
4 INJECTION INTRAMUSCULAR; INTRAVENOUS EVERY 6 HOURS
Status: DISCONTINUED | OUTPATIENT
Start: 2020-02-20 | End: 2020-02-20 | Stop reason: HOSPADM

## 2020-02-19 RX ORDER — HYDROCHLOROTHIAZIDE 12.5 MG/1
12.5 TABLET ORAL DAILY
COMMUNITY
Start: 2020-01-31

## 2020-02-19 RX ORDER — ONDANSETRON 2 MG/ML
4 INJECTION INTRAMUSCULAR; INTRAVENOUS
Status: DISCONTINUED | OUTPATIENT
Start: 2020-02-19 | End: 2020-02-19 | Stop reason: HOSPADM

## 2020-02-19 RX ADMIN — FENTANYL CITRATE 50 MCG: 50 INJECTION, SOLUTION INTRAMUSCULAR; INTRAVENOUS at 16:31

## 2020-02-19 RX ADMIN — ONDANSETRON 4 MG: 2 INJECTION INTRAMUSCULAR; INTRAVENOUS at 16:06

## 2020-02-19 RX ADMIN — FENTANYL CITRATE 50 MCG: 50 INJECTION, SOLUTION INTRAMUSCULAR; INTRAVENOUS at 15:55

## 2020-02-19 RX ADMIN — FENTANYL CITRATE 50 MCG: 0.05 INJECTION, SOLUTION INTRAMUSCULAR; INTRAVENOUS at 17:30

## 2020-02-19 RX ADMIN — MIDAZOLAM HYDROCHLORIDE 1 MG: 1 INJECTION, SOLUTION INTRAMUSCULAR; INTRAVENOUS at 17:33

## 2020-02-19 RX ADMIN — MIDAZOLAM HYDROCHLORIDE 1 MG: 1 INJECTION, SOLUTION INTRAMUSCULAR; INTRAVENOUS at 18:04

## 2020-02-19 RX ADMIN — POTASSIUM CHLORIDE: 149 INJECTION, SOLUTION, CONCENTRATE INTRAVENOUS at 22:00

## 2020-02-19 RX ADMIN — FENTANYL CITRATE 50 MCG: 0.05 INJECTION, SOLUTION INTRAMUSCULAR; INTRAVENOUS at 16:50

## 2020-02-19 RX ADMIN — FENTANYL CITRATE 50 MCG: 50 INJECTION, SOLUTION INTRAMUSCULAR; INTRAVENOUS at 16:20

## 2020-02-19 RX ADMIN — MIDAZOLAM HYDROCHLORIDE 2 MG: 1 INJECTION, SOLUTION INTRAMUSCULAR; INTRAVENOUS at 15:37

## 2020-02-19 RX ADMIN — ACETAMINOPHEN 1 G: 10 INJECTION, SOLUTION INTRAVENOUS at 14:46

## 2020-02-19 RX ADMIN — FENTANYL CITRATE 50 MCG: 0.05 INJECTION, SOLUTION INTRAMUSCULAR; INTRAVENOUS at 16:44

## 2020-02-19 RX ADMIN — OXYCODONE HYDROCHLORIDE 10 MG: 5 SOLUTION ORAL at 23:59

## 2020-02-19 RX ADMIN — FENTANYL CITRATE 50 MCG: 0.05 INJECTION, SOLUTION INTRAMUSCULAR; INTRAVENOUS at 19:04

## 2020-02-19 RX ADMIN — DEXAMETHASONE SODIUM PHOSPHATE 4 MG: 4 INJECTION, SOLUTION INTRA-ARTICULAR; INTRALESIONAL; INTRAMUSCULAR; INTRAVENOUS; SOFT TISSUE at 16:06

## 2020-02-19 RX ADMIN — FENTANYL CITRATE 50 MCG: 0.05 INJECTION, SOLUTION INTRAMUSCULAR; INTRAVENOUS at 19:43

## 2020-02-19 RX ADMIN — SUGAMMADEX 200 MG: 100 INJECTION, SOLUTION INTRAVENOUS at 16:20

## 2020-02-19 RX ADMIN — LABETALOL HYDROCHLORIDE 10 MG: 5 INJECTION, SOLUTION INTRAVENOUS at 16:02

## 2020-02-19 RX ADMIN — OXYCODONE HYDROCHLORIDE 10 MG: 10 TABLET, FILM COATED, EXTENDED RELEASE ORAL at 14:44

## 2020-02-19 RX ADMIN — ROCURONIUM BROMIDE 50 MG: 10 INJECTION, SOLUTION INTRAVENOUS at 15:38

## 2020-02-19 RX ADMIN — CETIRIZINE HYDROCHLORIDE 10 MG: 10 TABLET, FILM COATED ORAL at 23:59

## 2020-02-19 RX ADMIN — SODIUM CHLORIDE, POTASSIUM CHLORIDE, SODIUM LACTATE AND CALCIUM CHLORIDE: 600; 310; 30; 20 INJECTION, SOLUTION INTRAVENOUS at 14:46

## 2020-02-19 RX ADMIN — FENTANYL CITRATE 50 MCG: 0.05 INJECTION, SOLUTION INTRAMUSCULAR; INTRAVENOUS at 17:45

## 2020-02-19 RX ADMIN — MIDAZOLAM HYDROCHLORIDE 1 MG: 1 INJECTION, SOLUTION INTRAMUSCULAR; INTRAVENOUS at 19:05

## 2020-02-19 RX ADMIN — PROPOFOL 200 MG: 10 INJECTION, EMULSION INTRAVENOUS at 15:38

## 2020-02-19 RX ADMIN — HYDROMORPHONE HYDROCHLORIDE 0.5 MG: 1 INJECTION, SOLUTION INTRAMUSCULAR; INTRAVENOUS; SUBCUTANEOUS at 21:52

## 2020-02-19 RX ADMIN — CEFOTETAN DISODIUM 2 G: 2 INJECTION, POWDER, FOR SOLUTION INTRAMUSCULAR; INTRAVENOUS at 15:34

## 2020-02-19 RX ADMIN — ACETAMINOPHEN 1000 MG: 10 INJECTION, SOLUTION INTRAVENOUS at 20:30

## 2020-02-19 RX ADMIN — FAMOTIDINE 20 MG: 10 INJECTION INTRAVENOUS at 21:52

## 2020-02-19 RX ADMIN — FENTANYL CITRATE 50 MCG: 50 INJECTION, SOLUTION INTRAMUSCULAR; INTRAVENOUS at 16:44

## 2020-02-19 RX ADMIN — FENTANYL CITRATE 100 MCG: 50 INJECTION, SOLUTION INTRAMUSCULAR; INTRAVENOUS at 15:38

## 2020-02-19 ASSESSMENT — LIFESTYLE VARIABLES
DOES PATIENT WANT TO STOP DRINKING: NO
TOTAL SCORE: 0
HAVE PEOPLE ANNOYED YOU BY CRITICIZING YOUR DRINKING: NO
HOW MANY TIMES IN THE PAST YEAR HAVE YOU HAD 5 OR MORE DRINKS IN A DAY: 0
TOTAL SCORE: 0
EVER FELT BAD OR GUILTY ABOUT YOUR DRINKING: NO
EVER HAD A DRINK FIRST THING IN THE MORNING TO STEADY YOUR NERVES TO GET RID OF A HANGOVER: NO
HAVE YOU EVER FELT YOU SHOULD CUT DOWN ON YOUR DRINKING: NO
ALCOHOL_USE: YES
EVER_SMOKED: NEVER
EVER_SMOKED: NEVER
ON A TYPICAL DAY WHEN YOU DRINK ALCOHOL HOW MANY DRINKS DO YOU HAVE: 1
AVERAGE NUMBER OF DAYS PER WEEK YOU HAVE A DRINK CONTAINING ALCOHOL: 0
CONSUMPTION TOTAL: NEGATIVE
TOTAL SCORE: 0

## 2020-02-19 ASSESSMENT — COGNITIVE AND FUNCTIONAL STATUS - GENERAL
CLIMB 3 TO 5 STEPS WITH RAILING: A LITTLE
DRESSING REGULAR UPPER BODY CLOTHING: A LITTLE
HELP NEEDED FOR BATHING: A LITTLE
TOILETING: A LITTLE
WALKING IN HOSPITAL ROOM: A LITTLE
SUGGESTED CMS G CODE MODIFIER MOBILITY: CK
TURNING FROM BACK TO SIDE WHILE IN FLAT BAD: A LITTLE
DAILY ACTIVITIY SCORE: 18
MOVING TO AND FROM BED TO CHAIR: A LITTLE
MOVING FROM LYING ON BACK TO SITTING ON SIDE OF FLAT BED: A LITTLE
PERSONAL GROOMING: A LITTLE
EATING MEALS: A LITTLE
MOBILITY SCORE: 18
STANDING UP FROM CHAIR USING ARMS: A LITTLE
SUGGESTED CMS G CODE MODIFIER DAILY ACTIVITY: CK
DRESSING REGULAR LOWER BODY CLOTHING: A LITTLE

## 2020-02-19 ASSESSMENT — COPD QUESTIONNAIRES
HAVE YOU SMOKED AT LEAST 100 CIGARETTES IN YOUR ENTIRE LIFE: NO/DON'T KNOW
DO YOU EVER COUGH UP ANY MUCUS OR PHLEGM?: NO/ONLY WITH OCCASIONAL COLDS OR INFECTIONS
COPD SCREENING SCORE: 0
DURING THE PAST 4 WEEKS HOW MUCH DID YOU FEEL SHORT OF BREATH: NONE/LITTLE OF THE TIME

## 2020-02-19 ASSESSMENT — PAIN SCALES - GENERAL: PAIN_LEVEL: 2

## 2020-02-19 ASSESSMENT — PATIENT HEALTH QUESTIONNAIRE - PHQ9
1. LITTLE INTEREST OR PLEASURE IN DOING THINGS: NOT AT ALL
2. FEELING DOWN, DEPRESSED, IRRITABLE, OR HOPELESS: NOT AT ALL
SUM OF ALL RESPONSES TO PHQ9 QUESTIONS 1 AND 2: 0

## 2020-02-19 NOTE — ANESTHESIA PROCEDURE NOTES
Airway  Date/Time: 2/19/2020 3:37 PM  Performed by: Gino Barker M.D.  Authorized by: Gino Barker M.D.     Location:  OR  Urgency:  Elective  Indications for Airway Management:  Anesthesia  Spontaneous Ventilation: absent    Sedation Level:  Deep  Preoxygenated: Yes    Patient Position:  Sniffing  Final Airway Type:  Endotracheal airway  Final Endotracheal Airway:  ETT  Cuffed: Yes    Technique Used for Successful ETT Placement:  Direct laryngoscopy  Insertion Site:  Oral  Blade Type:  Noa  Laryngoscope Blade/Videolaryngoscope Blade Size:  3  ETT Size (mm):  7.0  Measured from:  Teeth  ETT to Teeth (cm):  22  Placement Verified by: auscultation and capnometry    Cormack-Lehane Classification:  Grade I - full view of glottis  Number of Attempts at Approach:  1

## 2020-02-19 NOTE — ANESTHESIA PREPROCEDURE EVALUATION
Relevant Problems   CARDIAC   (+) Hypertension   (+) Migraine headache       Physical Exam    Airway   Mallampati: II  TM distance: >3 FB  Neck ROM: full       Cardiovascular - normal exam  Rhythm: regular  Rate: normal  (-) murmur     Dental - normal exam         Pulmonary - normal exam  Breath sounds clear to auscultation     Abdominal    Neurological - normal exam                 Anesthesia Plan    ASA 3       Plan - general       Airway plan will be ETT        Induction: intravenous    Postoperative Plan: Postoperative administration of opioids is intended.    Pertinent diagnostic labs and testing reviewed    Informed Consent:    Anesthetic plan and risks discussed with patient.    Use of blood products discussed with: patient whom consented to blood products.

## 2020-02-20 ENCOUNTER — DOCUMENTATION (OUTPATIENT)
Dept: HEALTH INFORMATION MANAGEMENT | Facility: OTHER | Age: 30
End: 2020-02-20

## 2020-02-20 VITALS
DIASTOLIC BLOOD PRESSURE: 71 MMHG | TEMPERATURE: 97.9 F | HEIGHT: 70 IN | WEIGHT: 293 LBS | BODY MASS INDEX: 41.95 KG/M2 | SYSTOLIC BLOOD PRESSURE: 134 MMHG | OXYGEN SATURATION: 92 % | RESPIRATION RATE: 18 BRPM | HEART RATE: 78 BPM

## 2020-02-20 LAB
ALBUMIN SERPL BCP-MCNC: 3.6 G/DL (ref 3.2–4.9)
ALBUMIN/GLOB SERPL: 1.3 G/DL
ALP SERPL-CCNC: 52 U/L (ref 30–99)
ALT SERPL-CCNC: 49 U/L (ref 2–50)
ANION GAP SERPL CALC-SCNC: 10 MMOL/L (ref 0–11.9)
AST SERPL-CCNC: 28 U/L (ref 12–45)
BASOPHILS # BLD AUTO: 0.1 % (ref 0–1.8)
BASOPHILS # BLD: 0.01 K/UL (ref 0–0.12)
BILIRUB SERPL-MCNC: 0.5 MG/DL (ref 0.1–1.5)
BUN SERPL-MCNC: 10 MG/DL (ref 8–22)
CALCIUM SERPL-MCNC: 8.6 MG/DL (ref 8.5–10.5)
CHLORIDE SERPL-SCNC: 107 MMOL/L (ref 96–112)
CO2 SERPL-SCNC: 22 MMOL/L (ref 20–33)
CREAT SERPL-MCNC: 0.76 MG/DL (ref 0.5–1.4)
EOSINOPHIL # BLD AUTO: 0 K/UL (ref 0–0.51)
EOSINOPHIL NFR BLD: 0 % (ref 0–6.9)
ERYTHROCYTE [DISTWIDTH] IN BLOOD BY AUTOMATED COUNT: 38.2 FL (ref 35.9–50)
GLOBULIN SER CALC-MCNC: 2.7 G/DL (ref 1.9–3.5)
GLUCOSE SERPL-MCNC: 141 MG/DL (ref 65–99)
HCT VFR BLD AUTO: 37.6 % (ref 37–47)
HGB BLD-MCNC: 12.9 G/DL (ref 12–16)
IMM GRANULOCYTES # BLD AUTO: 0.03 K/UL (ref 0–0.11)
IMM GRANULOCYTES NFR BLD AUTO: 0.3 % (ref 0–0.9)
LYMPHOCYTES # BLD AUTO: 0.82 K/UL (ref 1–4.8)
LYMPHOCYTES NFR BLD: 7.6 % (ref 22–41)
MCH RBC QN AUTO: 28.5 PG (ref 27–33)
MCHC RBC AUTO-ENTMCNC: 34.3 G/DL (ref 33.6–35)
MCV RBC AUTO: 83 FL (ref 81.4–97.8)
MONOCYTES # BLD AUTO: 0.44 K/UL (ref 0–0.85)
MONOCYTES NFR BLD AUTO: 4.1 % (ref 0–13.4)
NEUTROPHILS # BLD AUTO: 9.51 K/UL (ref 2–7.15)
NEUTROPHILS NFR BLD: 87.9 % (ref 44–72)
NRBC # BLD AUTO: 0 K/UL
NRBC BLD-RTO: 0 /100 WBC
PATHOLOGY CONSULT NOTE: NORMAL
PLATELET # BLD AUTO: 255 K/UL (ref 164–446)
PMV BLD AUTO: 10.4 FL (ref 9–12.9)
POTASSIUM SERPL-SCNC: 3.5 MMOL/L (ref 3.6–5.5)
PROT SERPL-MCNC: 6.3 G/DL (ref 6–8.2)
RBC # BLD AUTO: 4.53 M/UL (ref 4.2–5.4)
SODIUM SERPL-SCNC: 139 MMOL/L (ref 135–145)
WBC # BLD AUTO: 10.8 K/UL (ref 4.8–10.8)

## 2020-02-20 PROCEDURE — 80053 COMPREHEN METABOLIC PANEL: CPT

## 2020-02-20 PROCEDURE — 700105 HCHG RX REV CODE 258: Performed by: NURSE PRACTITIONER

## 2020-02-20 PROCEDURE — A9270 NON-COVERED ITEM OR SERVICE: HCPCS | Performed by: PHYSICIAN ASSISTANT

## 2020-02-20 PROCEDURE — 36415 COLL VENOUS BLD VENIPUNCTURE: CPT

## 2020-02-20 PROCEDURE — A9270 NON-COVERED ITEM OR SERVICE: HCPCS | Performed by: NURSE PRACTITIONER

## 2020-02-20 PROCEDURE — 700101 HCHG RX REV CODE 250: Performed by: NURSE PRACTITIONER

## 2020-02-20 PROCEDURE — 700111 HCHG RX REV CODE 636 W/ 250 OVERRIDE (IP): Performed by: NURSE PRACTITIONER

## 2020-02-20 PROCEDURE — 700102 HCHG RX REV CODE 250 W/ 637 OVERRIDE(OP): Performed by: NURSE PRACTITIONER

## 2020-02-20 PROCEDURE — 700102 HCHG RX REV CODE 250 W/ 637 OVERRIDE(OP): Performed by: PHYSICIAN ASSISTANT

## 2020-02-20 PROCEDURE — 85025 COMPLETE CBC W/AUTO DIFF WBC: CPT

## 2020-02-20 RX ORDER — SODIUM CHLORIDE 9 MG/ML
INJECTION, SOLUTION INTRAVENOUS
Status: ACTIVE
Start: 2020-02-20 | End: 2020-02-20

## 2020-02-20 RX ORDER — TAMSULOSIN HYDROCHLORIDE 0.4 MG/1
0.4 CAPSULE ORAL
Status: DISCONTINUED | OUTPATIENT
Start: 2020-02-20 | End: 2020-02-20 | Stop reason: HOSPADM

## 2020-02-20 RX ADMIN — ENOXAPARIN SODIUM 40 MG: 100 INJECTION SUBCUTANEOUS at 09:33

## 2020-02-20 RX ADMIN — OXYCODONE HYDROCHLORIDE 5 MG: 5 SOLUTION ORAL at 17:21

## 2020-02-20 RX ADMIN — OXYCODONE HYDROCHLORIDE 10 MG: 5 SOLUTION ORAL at 03:10

## 2020-02-20 RX ADMIN — SERTRALINE HYDROCHLORIDE 100 MG: 100 TABLET ORAL at 00:01

## 2020-02-20 RX ADMIN — OXYCODONE HYDROCHLORIDE 10 MG: 5 SOLUTION ORAL at 06:21

## 2020-02-20 RX ADMIN — POTASSIUM CHLORIDE: 149 INJECTION, SOLUTION, CONCENTRATE INTRAVENOUS at 05:15

## 2020-02-20 RX ADMIN — OXYCODONE HYDROCHLORIDE 5 MG: 5 SOLUTION ORAL at 14:05

## 2020-02-20 RX ADMIN — TAMSULOSIN HYDROCHLORIDE 0.4 MG: 0.4 CAPSULE ORAL at 09:33

## 2020-02-20 RX ADMIN — ATENOLOL 25 MG: 50 TABLET ORAL at 00:01

## 2020-02-20 RX ADMIN — FAMOTIDINE 20 MG: 10 INJECTION INTRAVENOUS at 05:14

## 2020-02-20 RX ADMIN — ONDANSETRON 4 MG: 2 INJECTION INTRAMUSCULAR; INTRAVENOUS at 12:10

## 2020-02-20 RX ADMIN — ACETAMINOPHEN 1000 MG: 10 INJECTION, SOLUTION INTRAVENOUS at 03:06

## 2020-02-20 RX ADMIN — ONDANSETRON 4 MG: 2 INJECTION INTRAMUSCULAR; INTRAVENOUS at 05:14

## 2020-02-20 RX ADMIN — TOPIRAMATE 50 MG: 25 TABLET, FILM COATED ORAL at 00:01

## 2020-02-20 RX ADMIN — OXYCODONE HYDROCHLORIDE 5 MG: 5 SOLUTION ORAL at 09:33

## 2020-02-20 RX ADMIN — OMEPRAZOLE 20 MG: 20 CAPSULE, DELAYED RELEASE ORAL at 09:33

## 2020-02-20 RX ADMIN — HYDROCHLOROTHIAZIDE 12.5 MG: 12.5 TABLET ORAL at 09:33

## 2020-02-20 RX ADMIN — TOPIRAMATE 100 MG: 100 TABLET, FILM COATED ORAL at 05:13

## 2020-02-20 RX ADMIN — LOSARTAN POTASSIUM 50 MG: 50 TABLET, FILM COATED ORAL at 12:30

## 2020-02-20 RX ADMIN — ONDANSETRON 4 MG: 2 INJECTION INTRAMUSCULAR; INTRAVENOUS at 00:01

## 2020-02-20 ASSESSMENT — ENCOUNTER SYMPTOMS
HEARTBURN: 0
CHILLS: 0
NAUSEA: 0
SHORTNESS OF BREATH: 0
DIARRHEA: 0
COUGH: 0
VOMITING: 0
ABDOMINAL PAIN: 1
FEVER: 0

## 2020-02-20 NOTE — CARE PLAN
Problem: Pain Management  Goal: Pain level will decrease to patient's comfort goal  Outcome: PROGRESSING AS EXPECTED     Problem: Communication  Goal: The ability to communicate needs accurately and effectively will improve  Outcome: PROGRESSING AS EXPECTED

## 2020-02-20 NOTE — PROGRESS NOTES
Bedside report received.  Assessment complete.  A&O x 4. Patient calls appropriately.  Patient up with SB assist.    Patient has 5/10 pain. Medication given (see MAR)  Denies N&V. Tolerating clear gastric diet.  x4 lap sites with band aids, CDI. x1 lap with gauze and tegaderm, CDI.   + void via barnard, - flatus, - BM.  Patient denies SOB.  SCD's on.    Review plan with of care with patient. Call light and personal belongings with in reach. Hourly rounding in place. All needs met at this time.

## 2020-02-20 NOTE — CARE PLAN
Problem: Hyperinflation:  Goal: Prevent or improve atelectasis  Outcome: PROGRESSING AS EXPECTED     IS QID- 2000ml

## 2020-02-20 NOTE — ANESTHESIA POSTPROCEDURE EVALUATION
Patient: Rina Maher    Procedure Summary     Date:  02/19/20 Room / Location:  [place] [address] / SURGERY [facility]    Anesthesia Start:  1534 Anesthesia Stop:      Procedures:       GASTRECTOMY, SLEEVE, LAPAROSCOPIC (Abdomen)      BIOPSY, LIVER, LAPAROSCOPIC (Abdomen) Diagnosis:  (MORBID OBESITY)    Surgeon:  Abel Coleman M.D. Responsible Provider:  Gino Barker M.D.    Anesthesia Type:  general ASA Status:  3          Final Anesthesia Type: general  Last vitals  BP   Blood Pressure: 123/75    Temp   36.6 °C (97.8 °F)    Pulse   Pulse: 61   Resp   17    SpO2   96 %      Anesthesia Post Evaluation    Patient location during evaluation: PACU  Patient participation: complete - patient participated  Level of consciousness: awake and alert  Pain score: 2    Airway patency: patent  Anesthetic complications: no  Cardiovascular status: hemodynamically stable  Respiratory status: acceptable  Hydration status: euvolemic    PONV: none           Nurse Pain Score: 0 (NPRS)

## 2020-02-20 NOTE — OR NURSING
1800  Mother at bedside  1845  Patient up at bedside; from [facility] to bariatric bed; tolerated activity well  2100  Medicated for complaint of pain as ordered with pain subsiding to 4/10  Remains NPO; no complaints of nausea  Tylenol IV given for complaint of headache with relief  UP to bedside commode; unable to urinate; tolerated activity well  2113  Transferred to room via bed  Patient on room air

## 2020-02-20 NOTE — ANESTHESIA TIME REPORT
Anesthesia Start and Stop Event Times     Date Time Event    2/19/2020 1518 Ready for Procedure     1534 Anesthesia Start     1634 Anesthesia Stop        Responsible Staff  02/19/20    Name Role Begin End    Gino Barker M.D. Anesth 1534 1634        Preop Diagnosis (Free Text):  Pre-op Diagnosis     MORBID OBESITY        Preop Diagnosis (Codes):    Post op Diagnosis  S/P laparoscopic sleeve gastrectomy  liver biospy    Premium Reason  B. 1st Call    Comments: Premium Liz

## 2020-02-20 NOTE — ANESTHESIA QCDR
2019 [facility] Clinical Data Registry (for Quality Improvement)     Postoperative nausea/vomiting risk protocol (Adult = 18 yrs and Pediatric 3-17 yrs)- (430 and 463)  General inhalation anesthetic (NOT TIVA) with PONV risk factors: Yes  Provision of anti-emetic therapy with at least 2 different classes of agents: Yes   Patient DID NOT receive anti-emetic therapy and reason is documented in Medical Record:  N/A    Multimodal Pain Management- (477)  Non-emergent surgery AND patient age >= 18:   Use of Multimodal Pain Management, two or more drugs and/or interventions, NOT including systemic opioids:   Exception: Documented allergy to multiple classes of analgesics:     Smoking Abstinence (404)  Patient is current smoker (cigarette, pipe, e-cig, marijuanna):   Elective Surgery:   Abstinence instructions provided prior to day of surgery:   Patient abstained from smoking on day of surgery:     Pre-Op Beta-Blocker in Isolated CABG (44)  Isolated CABG AND patient age >= 18:   Beta-blocker admin within 24 hours of surgical incision:   Exception:of medical reason(s) for not administering beta blocker within 24 hours prior to surgical incision (e.g., not  indicated,other medical reason):     PACU assessment of acute postoperative pain prior to Anesthesia Care End- Applies to Patients Age = 18- (ABG7)  Initial PACU pain score is which of the following: < 7/10  Patient unable to report pain score: N/A    Post-anesthetic transfer of care checklist/protocol to PACU/ICU- (426 and 427)  Upon conclusion of case, patient transferred to which of the following locations: PACU/Non-ICU  Use of transfer checklist/protocol:   Exclusion: Service Performed in Patient Hospital Room (and thus did not require transfer):   Unplanned admission to ICU related to anesthesia service up through end of PACU care- (MD51)  Unplanned admission to ICU (not initially anticipated at anesthesia start time): No

## 2020-02-20 NOTE — PROGRESS NOTES
Surgical Progress Note    Author: JEB Cortes Date & Time created: 2020   8:22 AM     Interval Events:  Pt doing well POD #1 s/p sleeve gastrectomy.  Denies nausea, vomiting. Ambulating to restroom. tolerating PO. Some trouble voiding. Barnard in place. Laying in bed.Alert and oriented. NAD. Breathing unlabored. Abdomen soft, mildly distended, tender to incisions. Dressings clean, dry, intact. VS reviewed. Labs reviewed. Encourage ambulation. Flomax ordered. May remove barnard later today. If ambulating, voiding, tolerating PO, VS stable, then possibly home later today. Discussed with Dr. Coleman.  Review of Systems   Constitutional: Negative for chills and fever.   Respiratory: Negative for cough and shortness of breath.    Gastrointestinal: Positive for abdominal pain. Negative for diarrhea, heartburn, nausea and vomiting.   Genitourinary:        Trouble voiding throughout the night.   Skin: Negative for itching and rash.     Hemodynamics:  Temp (24hrs), Av.7 °C (98 °F), Min:36.2 °C (97.1 °F), Max:36.9 °C (98.4 °F)  Temperature: 36.9 °C (98.4 °F)  Pulse  Av.9  Min: 61  Max: 106   Blood Pressure: 124/72     Respiratory:    Respiration: 16, Pulse Oximetry: 97 %        RUL Breath Sounds: Clear, RML Breath Sounds: Clear, RLL Breath Sounds: Diminished, GRAY Breath Sounds: Clear, LLL Breath Sounds: Diminished  Neuro:  GCS       Fluids:    Intake/Output Summary (Last 24 hours) at 2020 0822  Last data filed at 2020 0400  Gross per 24 hour   Intake 3150 ml   Output 200 ml   Net 2950 ml     Weight: (!) 161.9 kg (356 lb 14.8 oz)  Current Diet Order   Procedures   • Diet Order Clear Liquid     Physical Exam  Vitals signs reviewed.   Constitutional:       General: She is not in acute distress.     Appearance: Normal appearance. She is obese. She is not ill-appearing.   Cardiovascular:      Rate and Rhythm: Normal rate.   Pulmonary:      Effort: Pulmonary effort is normal. No respiratory distress.    Abdominal:      General: There is distension.      Palpations: Abdomen is soft.      Tenderness: There is abdominal tenderness.   Skin:     General: Skin is warm and dry.   Neurological:      General: No focal deficit present.      Mental Status: She is alert and oriented to person, place, and time.   Psychiatric:         Mood and Affect: Mood normal.         Behavior: Behavior normal.       Labs:  Recent Results (from the past 24 hour(s))   HCG QUAL SERUM    Collection Time: 02/19/20  2:48 PM   Result Value Ref Range    Beta-Hcg Qualitative Serum Negative Negative   POTASSIUM SERUM (K)    Collection Time: 02/19/20  2:48 PM   Result Value Ref Range    Potassium 3.2 (L) 3.6 - 5.5 mmol/L   CBC WITH DIFFERENTIAL    Collection Time: 02/20/20  2:15 AM   Result Value Ref Range    WBC 10.8 4.8 - 10.8 K/uL    RBC 4.53 4.20 - 5.40 M/uL    Hemoglobin 12.9 12.0 - 16.0 g/dL    Hematocrit 37.6 37.0 - 47.0 %    MCV 83.0 81.4 - 97.8 fL    MCH 28.5 27.0 - 33.0 pg    MCHC 34.3 33.6 - 35.0 g/dL    RDW 38.2 35.9 - 50.0 fL    Platelet Count 255 164 - 446 K/uL    MPV 10.4 9.0 - 12.9 fL    Neutrophils-Polys 87.90 (H) 44.00 - 72.00 %    Lymphocytes 7.60 (L) 22.00 - 41.00 %    Monocytes 4.10 0.00 - 13.40 %    Eosinophils 0.00 0.00 - 6.90 %    Basophils 0.10 0.00 - 1.80 %    Immature Granulocytes 0.30 0.00 - 0.90 %    Nucleated RBC 0.00 /100 WBC    Neutrophils (Absolute) 9.51 (H) 2.00 - 7.15 K/uL    Lymphs (Absolute) 0.82 (L) 1.00 - 4.80 K/uL    Monos (Absolute) 0.44 0.00 - 0.85 K/uL    Eos (Absolute) 0.00 0.00 - 0.51 K/uL    Baso (Absolute) 0.01 0.00 - 0.12 K/uL    Immature Granulocytes (abs) 0.03 0.00 - 0.11 K/uL    NRBC (Absolute) 0.00 K/uL   Comp Metabolic Panel (CMP)    Collection Time: 02/20/20  2:15 AM   Result Value Ref Range    Sodium 139 135 - 145 mmol/L    Potassium 3.5 (L) 3.6 - 5.5 mmol/L    Chloride 107 96 - 112 mmol/L    Co2 22 20 - 33 mmol/L    Anion Gap 10.0 0.0 - 11.9    Glucose 141 (H) 65 - 99 mg/dL    Bun 10 8 - 22  mg/dL    Creatinine 0.76 0.50 - 1.40 mg/dL    Calcium 8.6 8.5 - 10.5 mg/dL    AST(SGOT) 28 12 - 45 U/L    ALT(SGPT) 49 2 - 50 U/L    Alkaline Phosphatase 52 30 - 99 U/L    Total Bilirubin 0.5 0.1 - 1.5 mg/dL    Albumin 3.6 3.2 - 4.9 g/dL    Total Protein 6.3 6.0 - 8.2 g/dL    Globulin 2.7 1.9 - 3.5 g/dL    A-G Ratio 1.3 g/dL   ESTIMATED GFR    Collection Time: 02/20/20  2:15 AM   Result Value Ref Range    GFR If African American >60 >60 mL/min/1.73 m 2    GFR If Non African American >60 >60 mL/min/1.73 m 2   Histology Request    Collection Time: 02/20/20  7:00 AM   Result Value Ref Range    Pathology Request Sent to Histo      Medical Decision Making, by Problem:  There are no active hospital problems to display for this patient.    Plan:  Pt doing well POD #1 s/p sleeve gastrectomy.  Denies nausea, vomiting. Ambulating to restroom. tolerating PO. Some trouble voiding. Barnard in place. Laying in bed.Alert and oriented. NAD. Breathing unlabored. Abdomen soft, mildly distended, tender to incisions. Dressings clean, dry, intact. VS reviewed. Labs reviewed. Encourage ambulation. Flomax ordered. May remove barnard later today. If ambulating, voiding, tolerating PO, VS stable, then possibly home later today. Discussed with Dr. Coleman.    Quality Measures:  Quality-Core Measures   Reviewed items::  Labs reviewed and Medications reviewed  Barnard catheter::  Urinary Tract Retention or Urinary Tract Obstruction  DVT prophylaxis pharmacological::  Enoxaparin (Lovenox)  DVT prophylaxis - mechanical:  SCDs  Ulcer Prophylaxis::  Yes      Discussed patient condition with Patient and Dr. Coleman

## 2020-02-20 NOTE — PROGRESS NOTES
Patient arrived to T 433-1 from PACU via bariatric bed  AOx4  Pain rated at 7/10, medicated per MAR  Room air  Abdominal lap sites with steri strip and adhesive bandages in place, CDI   No complaints of nausea at this time, will start gastric clears at 0000  Awaiting void  -flatus -BM  Oriented to room call light and smoking policy.  Reviewed plan of care (equipment, incentive spirometer, sequential compression devices, medications, activity, diet, fall precautions, skin care, and pain) with patient and family. Welcome packet given and reviewed with, all questions answered

## 2020-02-20 NOTE — DIETARY
NUTRITION SERVICES: BMI - Pt with BMI >40 (=Body mass index is 51.21 kg/m².), morbid obesity. Weight loss counseling not appropriate in acute care setting. Pt s/p laparoscopic sleeve gastrectomy and liver biopsy. Anticipate F/u planned with MD/RD post D/c. RECOMMEND - Referral to outpatient nutrition services for weight management, or F/u with MD/RD after D/C.

## 2020-02-21 NOTE — PROGRESS NOTES
Patient discharged to home with family. Staff escort declined. IV discontinued. Discharge instructions given regarding diet, activity limitations, incision and dressing care, follow up appointments, and when to seek medical attention.  Education provided, patient asked questions and verbalized understanding. Discharge paperwork signed and in chart. Patient is tolerating diet, stable when ambulating, and pain is well controlled. All belongings collected. No further questions or concerns at this time.

## 2020-02-21 NOTE — CARE PLAN
Problem: Discharge Barriers/Planning  Goal: Patient's continuum of care needs will be met  Outcome: PROGRESSING AS EXPECTED  Note: Patient tolerating diet, -N/V, ambulating independently, pain controlled with oral medication, able to void independently after barnard removal, bladder scan 26 cc post void        Problem: Pain Management  Goal: Pain level will decrease to patient's comfort goal  2/20/2020 1632 by Justine Alvarado R.N.  Outcome: PROGRESSING AS EXPECTED  2/20/2020 1630 by Justine Alvarado, R.N.  Note: Assess pain Q2-4H, administer pain medication as indicated, encourage patient to voice pain to staff

## 2020-02-21 NOTE — DISCHARGE INSTRUCTIONS
Discharge Instructions    Discharged to home by car with relative. Discharged via wheelchair, hospital escort: Yes.  Special equipment needed: Not Applicable    Be sure to schedule a follow-up appointment with your primary care doctor or any specialists as instructed.     Discharge Plan:   Influenza Vaccine Indication: Not indicated: Previously immunized this influenza season and > 8 years of age    I understand that a diet low in cholesterol, fat, and sodium is recommended for good health. Unless I have been given specific instructions below for another diet, I accept this instruction as my diet prescription.       Special Instructions: None    · Is patient discharged on Warfarin / Coumadin?   No       Bariatric D/C instructions:    1. DIET: Follow the diet progression detailed in your post-op booklet.  Progressing as instructed will make for a smooth transition after surgery and prevent any pain associated with eating foods before your stomach is ready or eating too much.  Water and hydration is much more important than food intake the first week or two after surgery.  Drink enough water to keep your urine pale yellow.  Increase water intake if your urine is a darker yellow or if have burning with urination.  Nausea and vomiting once or twice after you leave the hospital is normal.  Sip fluids continually and stay hydrated.    2.  SUPPLEMENTS: Start your supplements 1 week after surgery.  You may need to cut some supplements in half initially.  Follow the guidelines from your supplement handout from your pre-op class.     3. ACTIVITIES: After discharge from the hospital, you may resume full routine activities. However, there should be no heavy lifting (greater than 15 pounds) and no strenuous activities until after your follow-up visit. Otherwise, routine activities of daily living are acceptable.  More movement and walking after surgery the better.    4. DRIVING: You may drive whenever you are off pain  medications and are able to perform the activities needed to drive, i.e. turning, bending, twisting, etc.    5. BATHING AND WOUND CARE: You may get the wound wet 2 days after surgery. You may shower, but do not submerge in a bath for at least a week. Dressings may come off after 48 hours. Please leave the steri-strips in place, they will fall off over 5-7 days. You may notice some clear or slightly bloody drainage from your wounds and there may be some mild redness or bruising around your incision sites.  This is normal.    6. BOWEL FUNCTION: Constipation is common after an operation, especially with pain medications. The combination of pain medication and decreased activity level can cause constipation in otherwise normal patients. If you feel this is occurring, take a laxative (Milk of Magnesia, Miralax, etc.) until the problem has resolved.  Diarrhea the first few days after surgery can also be normal.  You may notice that it is dark in color or see blood, which is also normal and should subside in the first week post-op.    7. PAIN MEDICATION: You have been given a prescription for pain medication preoperatively.  Please take these as directed. It is important to remember not to take medications on an empty stomach as this may cause nausea.  For minimal discomfort you may use liquid Tylenol 650 mg every 4 hours.  DO NOT exceed 4,000 mg of Tylenol in 24 hours.  DO NOT use non steroidal anti-inflamatory medication such as: Asprin, Ibuprofen, Advil, Motrin, Aleve, or steroids.  These medication can cause ulcers after weight loss surgery.    8.CALL IF YOU HAVE: (1) Fevers to more than 101.5 F, (2) leg pain or swelling (3) Drainage or fluid from incision that may be foul smelling, increased tenderness or soreness at the wound or the wound edges are no longer together, redness or swelling at the incision site. (4) Night Sweats (5) Shaking, Chills (6) Persistent Nausea or Vomiting for over 24 hours.  Use your anti  nausea medication as prescribed. (7) Call 911 if sudden onset of chest pain or shortness of breath that does not improve with 5-10 min of rest.    9. APPOINTMENT: Contact our office at [phone] for a follow-up appointment in 1 weeks following your procedure.  Our office hours are Monday-Friday, 8am-5pm.  Please try to call during these hours when we are better able to assist you.    If you have any additional questions, please do not hesitate to call the office and speak to either myself or the physician on call.    Office address:  [facility].  [address] [place] [address]  [address]  [place] [address]  Depression / Suicide Risk    As you are discharged from this [facility] facility, it is important to learn how to keep safe from harming yourself.    Recognize the warning signs:  · Abrupt changes in personality, positive or negative- including increase in energy   · Giving away possessions  · Change in eating patterns- significant weight changes-  positive or negative  · Change in sleeping patterns- unable to sleep or sleeping all the time   · Unwillingness or inability to communicate  · Depression  · Unusual sadness, discouragement and loneliness  · Talk of wanting to die  · Neglect of personal appearance   · Rebelliousness- reckless behavior  · Withdrawal from people/activities they love  · Confusion- inability to concentrate     If you or a loved one observes any of these behaviors or has concerns about self-harm, here's what you can do:  · Talk about it- your feelings and reasons for harming yourself  · Remove any means that you might use to hurt yourself (examples: pills, rope, extension cords, firearm)  · Get professional help from the community (Mental Health, Substance Abuse, psychological counseling)  · Do not be alone:Call your Safe Contact- someone whom you trust who will be there for you.  · Call your local CRISIS HOTLINE 563-5517 or 887-803-5618  · Call your local [employer] [employer] Crisis  Response Team [facility] (590) 371-0234 or www.[employer]  · Call the toll free National Suicide Prevention Hotlines   · National Suicide Prevention Lifeline 712-086-GBXD (6586)  · National Hope Line Network 800-SUICIDE (487-7671)

## 2020-02-23 NOTE — OP REPORT
This version of the note has been redacted during the course of a chart correction case. If you need access to the original text of this version of the note, please contact the Health Information Management department at [phone].

## 2020-02-26 NOTE — OP REPORT
NAME:  Rina Maher  MRN:  [id number]  :  1990        DATE OF OPERATION: 2020     PREOPERATIVE DIAGNOSIS: Morbid Obesity with medical sequelae; Congested Fatty Liver Disease     POSTOPERATIVE DIAGNOSIS: Morbid Obesity with medical sequelae; Congested Fatty Liver Disease     OPERATION PERFORMED: 1.  Laparoscopic Sleeve Gastrectomy  2.  Left Lobe Liver Biopsy      SURGEON: [facility] MD     ASSISTANT:  Milagros Carmona PA-C     ANESTHESIOLOGIST:  Anesthesiologist: Gino Barker M.D.     ANESTHESIA: General endotracheal anesthesia.      SPECIMEN: Stomach; Liver Biopsy     ESTIMATED BLOOD LOSS: <10cc.      INDICATIONS: The patient is a 29 y.o. female with a diagnosis of morbid obesity with medical sequelae. She is taken to the operating room today for Laparoscopic Sleeve Gastrectomy and liver biopsy.      PROCEDURE: Following informed consent, the patient was properly identified, taken to the operating room, and placed in the supine position where general endotracheal anesthesia was administered. Intravenous antibiotics were administered by the anesthesiologist in the correct time interval. Sequential compression devices were employed. The abdomen was prepped and draped into a sterile field.      An optical entry bladeless  trocar was utilized and pneumoperitoneum carefully established in the usual fashion.  The bladeless 5 mm separator trocar was introduced and the 5 mm lens/camera was passed into the peritoneal cavity.  Three additional separator trocars were placed under direct vision.  A 5 mm Benton-type liver retractor was placed into position.  This was used to elevate the left sided segment of the liver.  It was secured to the patients right side with a robot arm.  Careful inspection revealed no untoward events with placement of the trocars.     The gastrocolic omentum was examined and dissected with the ligasure device and a point on the distal antrum was selected to begin the sleeve  gastrectomy.  A 40 [place] bougie was then passed down into the antrum.  A careful inspection at the hiatus demonstrated no significant hiatal hernia which would require repair or risk significant reflux. A adBriteelon linear stapler with a thick-tissue cartridges, was employed to divide partway across the stomach.  With the bougie in position, the stapler was then used to march proximally along the stomach and transsection of the stomach was performed, beginning 5 cm proximal to the pylorus in the method of the sleeve gastrectomy.  The greater curvature aspect of the stomach was then dissected and the greater curvature vessels and short gastric vessels were divided with the ligasure.       The last endomechanical stapler firings were used to complete the transection of the stomach.  Hemoclips were used if any site exhibited oozing. Careful inspection of the staple line demonstrated excellent, meticulous hemostasis and a completely intact staple line with seemless tissue approximation.  Seromuscular sutures were placed using polysorb suture to further secure the staple line.  The liver exhibited hepatic steatosis.  A Trucut liver biopsy was obtained from the left lobe of the liver and sent for pathology.  Hemostasis on the liver was achieved with cautery. The bougie was then removed.      The large endocatch bag was then used to retrieve the stomach specimen.  Tisseal fibrin glue sealant was sprayed along the entire staple line. The ports were removed under direct vision and the pneumoperitoneum was allowed to escape. The fascia of this port was closed with 0-Vicryl suture.  The port sites were then irrigated well.  The port site skin incisions were closed with interrupted 4-0 Vicryl subcuticular sutures.  Steri-Strips and Benzoin were applied beneath sterile Band-Aids.      The patient tolerated the procedure well and there were no apparent complications. All sponge, needle, and instrument counts were correct on 2  separate occasions. She was awakened, extubated, and transferred to the recovery room in satisfactory condition.         ____________________________________   [facility] MD  DD: 2020  10:26 AM     CC:  [facility];NAME:  Rina Maher  MRN:  [id number]  :  1990

## 2020-03-06 NOTE — OP REPORT
NAME:  Rina Maher  MRN:  [id number]  :  1990      DATE OF OPERATION: 2020    PREOPERATIVE DIAGNOSIS: Morbid Obesity with medical sequelae; Congested Fatty Liver Disease/NAFLD    POSTOPERATIVE DIAGNOSIS: Morbid Obesity with medical sequelae; Congested Fatty Liver Disease/NAFLD    OPERATION PERFORMED: 1.  Laparoscopic Sleeve Gastrectomy  2.  Left Lobe Liver Biopsy     SURGEON: [facility] MD    ASSISTANT:  Milagros Carmona PA-C    ANESTHESIOLOGIST:  Anesthesiologist: Gino Barker M.D.    ANESTHESIA: General endotracheal anesthesia.     SPECIMEN: Stomach; Liver Biopsy    ESTIMATED BLOOD LOSS: <10cc.     INDICATIONS: The patient is a 29 y.o. female with a diagnosis of morbid obesity with medical sequelae. She is taken to the operating room today for Laparoscopic Sleeve Gastrectomy and liver biopsy.     PROCEDURE: Following informed consent, the patient was properly identified, taken to the operating room, and placed in the supine position where general endotracheal anesthesia was administered. Intravenous antibiotics were administered by the anesthesiologist in the correct time interval. Sequential compression devices were employed. The abdomen was prepped and draped into a sterile field.     An optical entry bladeless  trocar was utilized and pneumoperitoneum carefully established in the usual fashion.  The bladeless 5 mm separator trocar was introduced and the 5 mm lens/camera was passed into the peritoneal cavity.  Three additional separator trocars were placed under direct vision.  A 5 mm Benton-type liver retractor was placed into position.  This was used to elevate the left sided segment of the liver.  It was secured to the patients right side with a robot arm.  Careful inspection revealed no untoward events with placement of the trocars.    The gastrocolic omentum was examined and dissected with the ligasure device and a point on the distal antrum was selected to begin the sleeve  gastrectomy.  A 40 [place] bougie was then passed down into the antrum.  A careful inspection at the hiatus demonstrated no significant hiatal hernia which would require repair or risk significant reflux. A [place]elon linear stapler with a thick-tissue cartridges, was employed to divide partway across the stomach.  With the bougie in position, the stapler was then used to march proximally along the stomach and transsection of the stomach was performed, beginning 5 cm proximal to the pylorus in the method of the sleeve gastrectomy.  The greater curvature aspect of the stomach was then dissected and the greater curvature vessels and short gastric vessels were divided with the ligasure.      The last endomechanical stapler firings were used to complete the transection of the stomach.  Hemoclips were used if any site exhibited oozing. Careful inspection of the staple line demonstrated excellent, meticulous hemostasis and a completely intact staple line with seemless tissue approximation.  Seromuscular sutures were placed using polysorb suture to further secure the staple line.  The liver exhibited hepatic steatosis.  A Trucut liver biopsy was obtained from the left lobe of the liver and sent for pathology to assess severity of NAFLD.  Hemostasis on the liver was achieved with cautery. The bougie was then removed.     The large endocatch bag was then used to retrieve the stomach specimen.  Tisseal fibrin glue sealant was sprayed along the entire staple line. The ports were removed under direct vision and the pneumoperitoneum was allowed to escape. The fascia of this port was closed with 0-Vicryl suture.  The port sites were then irrigated well.  The port site skin incisions were closed with interrupted 4-0 Vicryl subcuticular sutures.  Steri-Strips and Benzoin were applied beneath sterile Band-Aids.     The patient tolerated the procedure well and there were no apparent complications. All sponge, needle, and instrument  counts were correct on 2 separate occasions. She was awakened, extubated, and transferred to the recovery room in satisfactory condition.       ____________________________________   [facility] MD  DD: 3/6/2020  9:50 AM    CC:  [facility];

## 2020-12-23 ENCOUNTER — HOSPITAL ENCOUNTER (OUTPATIENT)
Dept: RADIOLOGY | Facility: MEDICAL CENTER | Age: 30
End: 2020-12-23
Attending: CLINICAL NURSE SPECIALIST
Payer: MEDICAID

## 2020-12-23 DIAGNOSIS — M54.16 LUMBAR RADICULOPATHY: ICD-10-CM

## 2020-12-23 PROCEDURE — 72148 MRI LUMBAR SPINE W/O DYE: CPT

## 2020-12-23 PROCEDURE — 72110 X-RAY EXAM L-2 SPINE 4/>VWS: CPT

## 2021-04-06 ENCOUNTER — IMMUNIZATION (OUTPATIENT)
Dept: FAMILY PLANNING/WOMEN'S HEALTH CLINIC | Facility: IMMUNIZATION CENTER | Age: 31
End: 2021-04-06
Payer: MEDICAID

## 2021-04-06 DIAGNOSIS — Z23 ENCOUNTER FOR VACCINATION: Primary | ICD-10-CM

## 2021-04-06 PROCEDURE — 91300 PFIZER SARS-COV-2 VACCINE: CPT | Performed by: INTERNAL MEDICINE

## 2021-04-06 PROCEDURE — 0001A PFIZER SARS-COV-2 VACCINE: CPT | Performed by: INTERNAL MEDICINE

## 2021-04-24 ENCOUNTER — IMMUNIZATION (OUTPATIENT)
Dept: FAMILY PLANNING/WOMEN'S HEALTH CLINIC | Facility: IMMUNIZATION CENTER | Age: 31
End: 2021-04-24
Payer: MEDICAID

## 2021-04-24 DIAGNOSIS — Z23 ENCOUNTER FOR VACCINATION: Primary | ICD-10-CM

## 2021-04-24 PROCEDURE — 91300 PFIZER SARS-COV-2 VACCINE: CPT

## 2021-04-24 PROCEDURE — 0002A PFIZER SARS-COV-2 VACCINE: CPT

## 2021-10-21 ENCOUNTER — HOSPITAL ENCOUNTER (OUTPATIENT)
Dept: RADIOLOGY | Facility: MEDICAL CENTER | Age: 31
End: 2021-10-21
Attending: CLINICAL NURSE SPECIALIST
Payer: MEDICAID

## 2021-10-21 DIAGNOSIS — M54.16 LUMBAR RADICULOPATHY: ICD-10-CM

## 2021-10-21 PROCEDURE — 72110 X-RAY EXAM L-2 SPINE 4/>VWS: CPT

## 2021-10-21 PROCEDURE — 72148 MRI LUMBAR SPINE W/O DYE: CPT

## 2022-02-16 ENCOUNTER — APPOINTMENT (OUTPATIENT)
Dept: RADIOLOGY | Facility: MEDICAL CENTER | Age: 32
End: 2022-02-16
Attending: NEUROLOGICAL SURGERY
Payer: MEDICAID

## 2022-02-16 DIAGNOSIS — M21.371 FOOT DROP, RIGHT FOOT: ICD-10-CM

## 2022-02-16 PROCEDURE — 72197 MRI PELVIS W/O & W/DYE: CPT

## 2022-02-16 PROCEDURE — 700117 HCHG RX CONTRAST REV CODE 255: Performed by: NEUROLOGICAL SURGERY

## 2022-02-16 PROCEDURE — A9576 INJ PROHANCE MULTIPACK: HCPCS | Performed by: NEUROLOGICAL SURGERY

## 2022-02-16 RX ADMIN — GADOTERIDOL 20 ML: 279.3 INJECTION, SOLUTION INTRAVENOUS at 11:08

## 2023-06-16 ENCOUNTER — APPOINTMENT (OUTPATIENT)
Dept: RADIOLOGY | Facility: MEDICAL CENTER | Age: 33
End: 2023-06-16
Attending: NURSE PRACTITIONER
Payer: MEDICAID

## 2023-06-16 DIAGNOSIS — S93.402A SEVERE SPRAIN OF LEFT ANKLE, INITIAL ENCOUNTER: ICD-10-CM

## 2023-06-16 DIAGNOSIS — M76.829 POSTERIOR TIBIAL TENDON DYSFUNCTION: ICD-10-CM

## 2023-06-16 DIAGNOSIS — E66.01 MORBID OBESITY (HCC): ICD-10-CM

## 2023-06-16 PROCEDURE — 73721 MRI JNT OF LWR EXTRE W/O DYE: CPT

## 2023-06-20 ENCOUNTER — HOSPITAL ENCOUNTER (EMERGENCY)
Facility: MEDICAL CENTER | Age: 33
End: 2023-06-20
Attending: EMERGENCY MEDICINE
Payer: MEDICAID

## 2023-06-20 ENCOUNTER — APPOINTMENT (OUTPATIENT)
Dept: RADIOLOGY | Facility: MEDICAL CENTER | Age: 33
End: 2023-06-20
Attending: NURSE PRACTITIONER
Payer: MEDICAID

## 2023-06-20 VITALS
TEMPERATURE: 98 F | RESPIRATION RATE: 18 BRPM | OXYGEN SATURATION: 98 % | HEIGHT: 70 IN | BODY MASS INDEX: 41.95 KG/M2 | WEIGHT: 293 LBS | DIASTOLIC BLOOD PRESSURE: 90 MMHG | SYSTOLIC BLOOD PRESSURE: 145 MMHG | HEART RATE: 85 BPM

## 2023-06-20 DIAGNOSIS — M79.662 PAIN OF LEFT LOWER LEG: ICD-10-CM

## 2023-06-20 DIAGNOSIS — I82.4Y2 ACUTE DEEP VEIN THROMBOSIS (DVT) OF PROXIMAL VEIN OF LEFT LOWER EXTREMITY (HCC): ICD-10-CM

## 2023-06-20 LAB
ALBUMIN SERPL BCP-MCNC: 4.1 G/DL (ref 3.2–4.9)
ALBUMIN/GLOB SERPL: 1.3 G/DL
ALP SERPL-CCNC: 65 U/L (ref 30–99)
ALT SERPL-CCNC: 25 U/L (ref 2–50)
ANION GAP SERPL CALC-SCNC: 13 MMOL/L (ref 7–16)
APTT PPP: 25.1 SEC (ref 24.7–36)
AST SERPL-CCNC: 20 U/L (ref 12–45)
BASOPHILS # BLD AUTO: 0.4 % (ref 0–1.8)
BASOPHILS # BLD: 0.03 K/UL (ref 0–0.12)
BILIRUB SERPL-MCNC: 0.3 MG/DL (ref 0.1–1.5)
BUN SERPL-MCNC: 15 MG/DL (ref 8–22)
CALCIUM ALBUM COR SERPL-MCNC: 9.2 MG/DL (ref 8.5–10.5)
CALCIUM SERPL-MCNC: 9.3 MG/DL (ref 8.4–10.2)
CHLORIDE SERPL-SCNC: 107 MMOL/L (ref 96–112)
CO2 SERPL-SCNC: 20 MMOL/L (ref 20–33)
CREAT SERPL-MCNC: 0.84 MG/DL (ref 0.5–1.4)
EOSINOPHIL # BLD AUTO: 0.13 K/UL (ref 0–0.51)
EOSINOPHIL NFR BLD: 1.7 % (ref 0–6.9)
ERYTHROCYTE [DISTWIDTH] IN BLOOD BY AUTOMATED COUNT: 37.2 FL (ref 35.9–50)
GFR SERPLBLD CREATININE-BSD FMLA CKD-EPI: 94 ML/MIN/1.73 M 2
GLOBULIN SER CALC-MCNC: 3.1 G/DL (ref 1.9–3.5)
GLUCOSE SERPL-MCNC: 87 MG/DL (ref 65–99)
HCT VFR BLD AUTO: 42.6 % (ref 37–47)
HGB BLD-MCNC: 14.2 G/DL (ref 12–16)
IMM GRANULOCYTES # BLD AUTO: 0.04 K/UL (ref 0–0.11)
IMM GRANULOCYTES NFR BLD AUTO: 0.5 % (ref 0–0.9)
INR PPP: 1.06 (ref 0.87–1.13)
LYMPHOCYTES # BLD AUTO: 2.11 K/UL (ref 1–4.8)
LYMPHOCYTES NFR BLD: 27.7 % (ref 22–41)
MCH RBC QN AUTO: 27.8 PG (ref 27–33)
MCHC RBC AUTO-ENTMCNC: 33.3 G/DL (ref 32.2–35.5)
MCV RBC AUTO: 83.5 FL (ref 81.4–97.8)
MONOCYTES # BLD AUTO: 0.62 K/UL (ref 0–0.85)
MONOCYTES NFR BLD AUTO: 8.1 % (ref 0–13.4)
NEUTROPHILS # BLD AUTO: 4.68 K/UL (ref 1.82–7.42)
NEUTROPHILS NFR BLD: 61.6 % (ref 44–72)
NRBC # BLD AUTO: 0 K/UL
NRBC BLD-RTO: 0 /100 WBC (ref 0–0.2)
PLATELET # BLD AUTO: 287 K/UL (ref 164–446)
PMV BLD AUTO: 10.2 FL (ref 9–12.9)
POTASSIUM SERPL-SCNC: 3.5 MMOL/L (ref 3.6–5.5)
PROT SERPL-MCNC: 7.2 G/DL (ref 6–8.2)
PROTHROMBIN TIME: 13.7 SEC (ref 12–14.6)
RBC # BLD AUTO: 5.1 M/UL (ref 4.2–5.4)
SODIUM SERPL-SCNC: 140 MMOL/L (ref 135–145)
WBC # BLD AUTO: 7.6 K/UL (ref 4.8–10.8)

## 2023-06-20 PROCEDURE — A9270 NON-COVERED ITEM OR SERVICE: HCPCS | Performed by: EMERGENCY MEDICINE

## 2023-06-20 PROCEDURE — 85610 PROTHROMBIN TIME: CPT | Mod: 91

## 2023-06-20 PROCEDURE — 85730 THROMBOPLASTIN TIME PARTIAL: CPT | Mod: 91

## 2023-06-20 PROCEDURE — 85306 CLOT INHIBIT PROT S FREE: CPT

## 2023-06-20 PROCEDURE — 85025 COMPLETE CBC W/AUTO DIFF WBC: CPT

## 2023-06-20 PROCEDURE — 93971 EXTREMITY STUDY: CPT

## 2023-06-20 PROCEDURE — 86147 CARDIOLIPIN ANTIBODY EA IG: CPT | Mod: 91

## 2023-06-20 PROCEDURE — 85613 RUSSELL VIPER VENOM DILUTED: CPT

## 2023-06-20 PROCEDURE — 85303 CLOT INHIBIT PROT C ACTIVITY: CPT

## 2023-06-20 PROCEDURE — 700102 HCHG RX REV CODE 250 W/ 637 OVERRIDE(OP): Performed by: EMERGENCY MEDICINE

## 2023-06-20 PROCEDURE — 85730 THROMBOPLASTIN TIME PARTIAL: CPT

## 2023-06-20 PROCEDURE — 81240 F2 GENE: CPT

## 2023-06-20 PROCEDURE — 85610 PROTHROMBIN TIME: CPT

## 2023-06-20 PROCEDURE — 99283 EMERGENCY DEPT VISIT LOW MDM: CPT

## 2023-06-20 PROCEDURE — 36415 COLL VENOUS BLD VENIPUNCTURE: CPT

## 2023-06-20 PROCEDURE — 81241 F5 GENE: CPT

## 2023-06-20 PROCEDURE — 80053 COMPREHEN METABOLIC PANEL: CPT

## 2023-06-20 RX ORDER — HYDROCODONE BITARTRATE AND ACETAMINOPHEN 5; 325 MG/1; MG/1
1 TABLET ORAL EVERY 4 HOURS PRN
Status: SHIPPED | COMMUNITY
End: 2023-06-22

## 2023-06-20 RX ORDER — ACETAMINOPHEN 500 MG
1000 TABLET ORAL EVERY EVENING
COMMUNITY

## 2023-06-20 RX ADMIN — APIXABAN 10 MG: 5 TABLET, FILM COATED ORAL at 17:51

## 2023-06-20 NOTE — ED TRIAGE NOTES
Pt ambulates to triage  Chief Complaint   Patient presents with    Leg Pain     L leg pain    Sent by MD     pt reports she just had an US and was sent here because it was + for a DVT    Denies being on blood thinners  Family hx of DVT    Pt A & 0 x 4, speech clear, ambulates well    Pt updated on triage process and asked to inform RN of any changes while waiting in lobby.

## 2023-06-21 ENCOUNTER — TELEPHONE (OUTPATIENT)
Dept: MEDICAL GROUP | Facility: PHYSICIAN GROUP | Age: 33
End: 2023-06-21
Payer: MEDICAID

## 2023-06-21 NOTE — ED NOTES
Med rec updated and complete, per pt   Allergies reviewed, per pt  Pt had a list of medications that she read to this writer.

## 2023-06-21 NOTE — ED PROVIDER NOTES
ER Provider Note    Scribed for oRnn Varma M.d. by Penny Peterson. 6/20/2023  5:14 PM    Primary Care Provider: Jossue Chand M.D.    CHIEF COMPLAINT  Chief Complaint   Patient presents with    Leg Pain     L leg pain    Sent by MD     EXTERNAL RECORDS REVIEWED  Outpatient Notes Outside records show that the patient is positive for DVT from an ultrasound of the left lower extremity performed today.    HPI  LIMITATION TO HISTORY   Select: : None  OUTSIDE HISTORIAN(S):  None.    Rina Maher is a 32 y.o. female who presents to the ED for evaluation of left leg pain onset earlier today. She describes that she had an ultrasound performed today and was sent her for a positive DVT in her left lower extremity. The patient reports that she has a family history of blood clots, but no personal history of DVT or PE.  She did have a recent ankle sprain, which could be an inciting factor.  No cough or hemoptysis.  She denies the use of blood thinners.     PAST MEDICAL HISTORY  Past Medical History:   Diagnosis Date    Allergic rhinitis 6/27/2011    Anxiety 5/13/2011    BMI 45.0-49.9, adult ([facility]) 5/13/2011    BMI 50.0-59.9, adult ([facility]) 5/13/2011    Chronic pain 06/24/2019    thoracic    Dysmenorrhea 3/25/2015    Heart burn     treated with omeprazole.     History of anemia     Hypertension     Migraine headache 3/25/2015    Obesity, morbid (more than 100 lbs over ideal weight or BMI > 40) ([facility]) 8/30/2011    Pain     back     Psychiatric disorder     depression and anxiety    Sacral fracture ([facility]) 9/21/2012    Thoracic vertebral fracture ([facility]) 9/21/2012     SURGICAL HISTORY  Past Surgical History:   Procedure Laterality Date    KY LAP, CANDACE RESTRICT PROC, LONGITUDINAL GAS*  2/19/2020    Procedure: GASTRECTOMY, SLEEVE, LAPAROSCOPIC;  Surgeon: Abel Coleman M.D.;  Location: SURGERY [facility];  Service: General    KY LAP,BILIARY TRACT,UNLISTED  2/19/2020    Procedure: BIOPSY, LIVER,  LAPAROSCOPIC;  Surgeon: Abel Coleman M.D.;  Location: SURGERY [facility];  Service: General    COLONOSCOPY  6/26/2019    Procedure: COLONOSCOPY;  Surgeon: Be Gould M.D.;  Location: SURGERY [facility];  Service: Gastroenterology    DENTAL EXTRACTION(S)  2017    molar    MASS EXCISION ORTHO  10/6/2011    Performed by CARLOS MACHUCA at SURGERY [facility]    DANY BY LAPAROSCOPY  10/21/2008    COLONOSCOPY  2003    MYRINGOTOMY  1990     FAMILY HISTORY  Family History   Problem Relation Age of Onset    Hypertension Mother     Hyperlipidemia Mother     Hypertension Father     Heart Disease Other     Stroke Other     Cancer Other      SOCIAL HISTORY   reports that she has never smoked. She has never used smokeless tobacco. She reports that she does not drink alcohol and does not use drugs.    CURRENT MEDICATIONS  Discharge Medication List as of 6/20/2023  5:27 PM        CONTINUE these medications which have NOT CHANGED    Details   acetaminophen (TYLENOL) 500 MG Tab Take 1,000 mg by mouth every evening., Historical Med      HYDROcodone-acetaminophen (NORCO) 5-325 MG Tab per tablet Take 1 Tablet by mouth every four hours as needed. Indications: Pain, Historical Med      Multiple Vitamins-Minerals (BARIATRIC FUSION) Chew Tab Chew 1 Tablet 3 times a day., Historical Med      tizanidine (ZANAFLEX) 2 MG tablet Take 2 mg by mouth 2 times a day as needed. Indications: Muscle Spasticity, Musculoskeletal Pain, Historical Med      sumatriptan (IMITREX) 100 MG tablet Take 100 mg by mouth one time as needed for Migraine., Historical Med      !! sertraline (ZOLOFT) 50 MG Tab Take 50 mg by mouth every evening. Pt also has an RX for 100MG, pt takes total of 150MG every evening, Historical Med      pregabalin (LYRICA) 50 MG capsule Take 150 mg by mouth every evening., Historical Med      Lactobacillus Rhamnosus, GG, (CULTURELLE PO) Take 1 Capsule by mouth every evening., Historical Med      topiramate (TOPAMAX)  "100 MG Tab Take  mg by mouth 2 times a day. Pt takes 100MG in AM and 50MG in the evening, Historical Med      hydroCHLOROthiazide (HYDRODIURIL) 12.5 MG tablet Take 12.5 mg by mouth every day., Historical Med      fluticasone (FLONASE) 50 MCG/ACT nasal spray Spray 1 Spray in nose every bedtime., Historical Med      !! sertraline (ZOLOFT) 100 MG Tab Take 100 mg by mouth at bedtime. Pt also has an RX for 50MG, pt takes total of 150MG every evening, Historical Med      cetirizine (KLS ALLER-TRESSA) 10 MG Tab Take 10 mg by mouth every bedtime., Historical Med      atenolol (TENORMIN) 25 MG Tab Take 25 mg by mouth every bedtime., Historical Med      omeprazole (PRILOSEC) 20 MG delayed-release capsule Take 20 mg by mouth every morning., Historical Med      MedroxyPROGESTERone Acetate (DEPO-PROVERA IM) Inject 1 Each into the shoulder, thigh, or buttocks every 3 months. Last dose 5/25/2023, Historical Med       !! - Potential duplicate medications found. Please discuss with provider.        ALLERGIES  Cat hair extract, Vicodin [hydrocodone-acetaminophen], Other environmental, Sulfa drugs, Tape, Hydrocodone-acetaminophen, and Tree nuts food allergy    PHYSICAL EXAM  VITAL SIGNS: BP (!) 138/101   Pulse 80   Temp 36.6 °C (97.8 °F) (Temporal)   Resp 16   Ht 1.778 m (5' 10\")   Wt (!) 157 kg (345 lb 14.4 oz)   SpO2 98%   BMI 49.63 kg/m²     Pulse ox interpretation: I interpret this pulse ox as normal.  Constitutional: Alert in no apparent distress.  HENT: No signs of trauma, Bilateral external ears normal, Nose normal.   Eyes: Pupils are equal and reactive, Conjunctiva normal, Non-icteric.   Neck: Normal range of motion, Supple, No stridor.    Cardiovascular: Normal peripheral perfusion  Thorax & Lungs: Unlabored respirations, equal chest expansion, no accessory muscle use  Abdomen: Non-distended  Skin:  No erythema, No rash.   Back: Normal alignment and ROM  Extremities: No gross deformity  Musculoskeletal: Good range " of motion in all major joints.   Neurologic: Alert, Normal motor function, No focal deficits noted.   Psychiatric: Affect normal, Judgment normal, Mood normal.     DIAGNOSTIC STUDIES    Labs:   Labs Reviewed   COMP METABOLIC PANEL - Abnormal; Notable for the following components:       Result Value    Potassium 3.5 (*)     All other components within normal limits    Narrative:     Indicate which anticoagulants the patient is on:->UNKNOWN   CBC WITH DIFFERENTIAL    Narrative:     Indicate which anticoagulants the patient is on:->UNKNOWN   PROTHROMBIN TIME    Narrative:     Indicate which anticoagulants the patient is on:->UNKNOWN   APTT    Narrative:     Indicate which anticoagulants the patient is on:->UNKNOWN   CORRECTED CALCIUM    Narrative:     Indicate which anticoagulants the patient is on:->UNKNOWN   ESTIMATED GFR    Narrative:     Indicate which anticoagulants the patient is on:->UNKNOWN   PROTEIN C FUNCTIONAL   PROTEIN S FUNCTIONAL   FACTOR V LEIDEN MUTATION    Narrative:     Indicate which anticoagulants the patient is on:->UNKNOWN   FACTOR II DNA ANALYSIS    Narrative:     Prothrombin [id number]  What is the name of the test you are ordering?->Prothrombin  [id number]  Will the results of this test change the management of the  patient's condition during his/her current  hospitalization?->No  ANTICARDIOLIPIN ANTIBODY  What is the name of the test you are  ordering?->ANTICARDIOLIPIN ANTIBODY  Will the results of this test change the management of the  patient's condition during his/her current  hospitalization?->No   ANTICARDIOLIPIN AB IGG,IGM,IGA    Narrative:     Prothrombin [id number]  What is the name of the test you are ordering?->Prothrombin  [id number]  Will the results of this test change the management of the  patient's condition during his/her current  hospitalization?->No  ANTICARDIOLIPIN ANTIBODY  What is the name of the test you are  ordering?->ANTICARDIOLIPIN ANTIBODY  Will the results of this test change  the management of the  patient's condition during his/her current  hospitalization?->No   LUPUS ANTICOAGULANT REFLEXIVE PANEL    Narrative:     Indicate which anticoagulants the patient is on:->UNKNOWN      COURSE & MEDICAL DECISION MAKING     ED Observation Status? No; Patient does not meet criteria for ED Observation.     INITIAL ASSESSMENT, COURSE AND PLAN  Care Narrative:     5:20 PM - Patient seen and examined at bedside. The patient is a 32 year old female who presents to the ED for positive ultrasound results for DVT of her left lower extremity. I informed the patient that I reviewed her ultrasound. Discussed plan of care, including performing lab work as preliminary testing for referral to anticoagulation clinic. Patient agrees to the plan of care. The patient will be medicated with Eliquis 10 mg. Ordered for Prothromin, Antithrombin III Func/Immunol, Lupus Anticoagulant, Factor V Leiden Mutation, Protein S Functional, Protein C Functional, APTT, Prothrombin Time, CMP, and CBC w/ Diff.  However, she does not need to stay for results, and can be started on Eliquis with appropriate anticoagulation clinic referral and preliminary prescription.  She understands the plan of care.        DISPOSITION AND DISCUSSIONS  I have discussed management of the patient with the following physicians and AFUA's:  None.    Discussion of management with other QHP or appropriate source(s): None     Escalation of care considered, and ultimately not performed: Repeat imaging, but I can see her ultrasound results and the above plan of care is appropriate.    Barriers to care at this time, including but not limited to:  None known .     Decision tools and prescription drugs considered including, but not limited to: Medication modification was performed at discharge to start the patient on appropriate anticoagulation for known DVT .    The patient will return for new or worsening symptoms and is stable at the time of  discharge.    DISPOSITION:  Patient will be discharged home in stable condition.    FOLLOW UP:  [facility]  [facility] [place] [address]  [phone]  Schedule an appointment as soon as possible for a visit   for anticoagulation clinic    OUTPATIENT MEDICATIONS:  Discharge Medication List as of 6/20/2023  5:27 PM        START taking these medications    Details   apixaban (ELIQUIS) 5mg Tab Take 2 Tablets by mouth 2 times a day for 7 days, THEN 1 Tablet 2 times a day for 23 days., Disp-74 Tablet, R-0, Print Rx Paper            FINAL DIAGNOSIS  1. Acute deep vein thrombosis (DVT) of proximal vein of left lower extremity (HCC)       IPenny (Scribe), am scribing for, and in the presence of, Ronn Varma M.D..    Electronically signed by: Penny Peterson (Scribe), 6/20/2023    IRonn M.D. personally performed the services described in this documentation, as scribed by Penny Peterson in my presence, and it is both accurate and complete.      The note accurately reflects work and decisions made by me.  Ronn Varma M.D.  6/22/2023  10:09 AM

## 2023-06-21 NOTE — TELEPHONE ENCOUNTER
[facility]    Received anticoagulation referral from Dr Varma on 6/20/23    Patient scheduled for NP appt 6/22/23    Insurance: medicaid  PCP: Non-[facility]  Locations to be seen: [facility] at [phone], fax [phone]    Maulik LyonD

## 2023-06-21 NOTE — DISCHARGE INSTRUCTIONS
Start you are prescription as directed.  Use the contact information provided to schedule a follow-up with anticoagulation clinic.

## 2023-06-22 ENCOUNTER — ANTICOAGULATION VISIT (OUTPATIENT)
Dept: VASCULAR LAB | Facility: MEDICAL CENTER | Age: 33
End: 2023-06-22
Attending: INTERNAL MEDICINE
Payer: MEDICAID

## 2023-06-22 ENCOUNTER — DOCUMENTATION (OUTPATIENT)
Dept: VASCULAR LAB | Facility: MEDICAL CENTER | Age: 33
End: 2023-06-22

## 2023-06-22 DIAGNOSIS — Z79.01 CHRONIC ANTICOAGULATION: ICD-10-CM

## 2023-06-22 LAB
CARDIOLIPIN IGA SER IA-ACNC: <10 APL
CARDIOLIPIN IGG SER IA-ACNC: <10 GPL
CARDIOLIPIN IGM SER IA-ACNC: 12 MPL
PROT C ACT/NOR PPP: 152 % (ref 83–168)
PROT S ACT/NOR PPP: 109 % (ref 57–131)

## 2023-06-22 PROCEDURE — 99212 OFFICE O/P EST SF 10 MIN: CPT

## 2023-06-22 NOTE — PROGRESS NOTES
NEW DOAC   Anticoagulation Summary  As of 2023      INR goal:     TTR:  --   INR used for dosing:     Warfarin maintenance plan:  No maintenance plan   Next INR check:     Target end date:               Anticoagulation Episode Summary       INR check location:      Preferred lab:      Send INR reminders to:      Comments:            Anticoagulation Patient Findings      PCP: Jossue Chand M.D.  Dx: LLE DVT  CHADSVASC = n/a  HAS-BLED = 0  Target End Date = 23    Pt Hx: Patient recently had an ankle sprain you were in a boot and was immobilizing. No prior VTE. No family history of blood clots that is known.     US imagin23  IMPRESSION:     1.  Acute/subacute thrombus within the mid to distal left femoral vein and within the gastrocnemius vein.     2.  The left posterior tibial peroneal veins are not delineated.     3.  Nonocclusive thrombus within the mid to distal left greater saphenous vein.    Labs:  Lab Results   Component Value Date/Time    WBC 7.6 2023 05:45 PM    RBC 5.10 2023 05:45 PM    HEMOGLOBIN 14.2 2023 05:45 PM    HEMATOCRIT 42.6 2023 05:45 PM    MCV 83.5 2023 05:45 PM    MCH 27.8 2023 05:45 PM    MCHC 33.3 2023 05:45 PM    MPV 10.2 2023 05:45 PM    NEUTSPOLYS 61.60 2023 05:45 PM    LYMPHOCYTES 27.70 2023 05:45 PM    MONOCYTES 8.10 2023 05:45 PM    EOSINOPHILS 1.70 2023 05:45 PM    BASOPHILS 0.40 2023 05:45 PM    HYPOCHROMIA 1+ 2013 08:03 AM      Lab Results   Component Value Date/Time    SODIUM 140 2023 05:45 PM    POTASSIUM 3.5 (L) 2023 05:45 PM    CHLORIDE 107 2023 05:45 PM    CO2 20 2023 05:45 PM    GLUCOSE 87 2023 05:45 PM    BUN 15 2023 05:45 PM    CREATININE 0.84 2023 05:45 PM          Pt is new to Eliquis and new to [facility]. Discussed:   Indication for DOAC therapy.  Importance of monitoring and compliance.   Monitoring parameters, signs and symptoms of  bleeding or clotting.  DOAC therapy, side effects, potential DDIs, OTC medications  Hormonal therapy -none  Pregnancy -discussed  DDI - none  Lifestyle safety, ie smoking, ETOH, hobby safety, fall safety/prevention  Procedures for missed doses or suspected missed doses, surgeries/procedures, travel, dental work, any medication changes    Start with Eliquis 10mg taken 2 times a day for 1 week and then decrease to 5mg taken 2 times daily thereafter. Pt will transition to 5 mg BID dose on 6/28/23    DOAC affordable = yes    Samples provided: no    Labs to be completed prior to next f/u - CBC, CMP    F/U - 1 month, 3 month for LOT    Maulik LyonD      Added [facility] to care team   CC: Dr Bloch                                                   [facility] Pharmacotherapy [facility] Consent      Name    Rina Maher    MRN number: [id number]    the following are guidelines for participation in the [facility].     I, ____Rina Maher_____, understand and voluntarily agree to participate in the [facility] and to have services provided to me by pharmacists working in collaboration with my provider.    I understand the pharmacist within the [facility] may initiate, modify or discontinue my medications, order appropriate testing and appointments, perform exams, monitor treatment, and make clinical evaluations and decisions pursuant to a collaborative practice agreement with my provider.  I understand the pharmacist within the [facility] is not a physician, osteopathic physician, advanced practice registered nurse or physician assistant and may not diagnose.  I will take all my medications as instructed and not change the way I take it without first talking to my provider or a pharmacist within the [facility].  I understand that if I am late to my  appointment I may not be able to be seen by a pharmacist at that time and will have to reschedule my appointment.  During appointment with pharmacist I understand that pharmacist has the right not to answer questions or perform services outside the pharmacist’s scope of practice.  By signing below, I provide informed consent for the pharmacist to provide these services and for my participation in the [facility] Program.      Rina Maher           [id number]          06/22/23  Patient Name                   MRN number  Date     ____Obtained verbal consent from Rina Maher____  Patient Signature

## 2023-06-23 LAB
APTT IMM NP PPP: ABNORMAL SEC (ref 32–48)
CONFIRM APTT STACLOT: ABNORMAL
DRVVT SCREEN TO CONFIRM RATIO: ABNORMAL RATIO
LA 3 SCREEN W REFLEX-IMP: ABNORMAL
LA NT PLATELET PPP: ABNORMAL S
MIXING DRVVT: ABNORMAL SEC (ref 33–44)
PROTHROMBIN TIME: 13.6 SEC (ref 12–15.5)
PT P HEP NEUT PPP: ABNORMAL SEC (ref 32–48)
REPTILASE TIME: ABNORMAL SEC
SCREEN APTT: 40 SEC (ref 32–48)
SCREEN DRVVT: 32 SEC (ref 33–44)
THROMBIN TIME: ABNORMAL SEC (ref 14.7–19.5)

## 2023-06-23 NOTE — PROGRESS NOTES
Initial anticoag note and most recent Ed note reviewed.     Patient with femoral dvt after ankle sprain and immobilzation.     Hypercoag w/u pending    Will treat for 3 mo and then re-evaluate.     Michael Bloch, MD  Anticoagulation Clinic    Cc:  TENZIN Chand

## 2023-06-24 LAB — F5 P.R506Q BLD/T QL: ABNORMAL

## 2023-06-25 LAB — F2 C.20210G>A GENO BLD/T: NEGATIVE

## 2023-07-10 ENCOUNTER — APPOINTMENT (OUTPATIENT)
Dept: LAB | Facility: MEDICAL CENTER | Age: 33
End: 2023-07-10
Payer: MEDICAID

## 2023-07-27 ENCOUNTER — ANTICOAGULATION VISIT (OUTPATIENT)
Dept: VASCULAR LAB | Facility: MEDICAL CENTER | Age: 33
End: 2023-07-27
Attending: INTERNAL MEDICINE
Payer: MEDICAID

## 2023-07-27 VITALS — DIASTOLIC BLOOD PRESSURE: 85 MMHG | HEART RATE: 76 BPM | SYSTOLIC BLOOD PRESSURE: 135 MMHG

## 2023-07-27 DIAGNOSIS — Z79.01 CHRONIC ANTICOAGULATION: ICD-10-CM

## 2023-07-27 PROCEDURE — 3079F DIAST BP 80-89 MM HG: CPT

## 2023-07-27 PROCEDURE — 3075F SYST BP GE 130 - 139MM HG: CPT

## 2023-07-27 PROCEDURE — 99212 OFFICE O/P EST SF 10 MIN: CPT

## 2023-07-27 NOTE — PROGRESS NOTES
Target end date:9/21/23     Indication: DVT     Drug: Eliquis 5mg BID     CHADsVASC = n/a    Health Status Since Last Assessment   Patient denies any new relevant medical problems, ED visits or hospitalizations   Patient denies any embolic events (stroke/tia/systemic embolism)    Adherence with DOAC Therapy   Pt has 0 missed any doses in the average week    Bleeding Risk Assessment     Endorses Epistaxis   Pt denies any excessive or unusual bleeding/hematomas.  Pt denies any GI bleeds or hematemesis.  Pt denies any concerning daily headache or sub dural hematoma symptoms.     Pt denies any hematuria    Latest Hemoglobin 14.2   ETOH overuse denies     Creatinine Clearance/Renal Function     Latest ClCr >100 ml./min    Latest Reference Range & Units 06/20/23 17:45   Creatinine 0.50 - 1.40 mg/dL 0.84   GFR (CKD-EPI) >60 mL/min/1.73 m 2 94     Hepatic function   Latest LFTs WNL    Pt denies any history of liver dysfunction    Latest Reference Range & Units 06/20/23 17:45   AST(SGOT) 12 - 45 U/L 20   ALT(SGPT) 2 - 50 U/L 25      Drug Interactions   Platelets: 287   ASA/other antiplatelets denies   NSAID none   Other drug interactions none   Verified no anticonvulsant or azole therapy, education provided for future use.     Examination   Blood Pressure 135/85   Symptomatic hypotension no   Significant gait impairment/imbalance/high risk for falls? none    Final Assessment and Recommendations:   Patient appears stable from the anticoagulation standpoint.     Benefits of continued DOAC therapy outweigh risks for this patient   Recommend pt continue with current DOAC therapy Eliquis 5mg BID   DOAC is affordable     Other Actions: cmp/ cbc hemogram ordered prior to next visit    Follow up:   Will follow up with patient 2 months.      Caro Coronado  PharmD

## 2023-08-01 ENCOUNTER — TELEPHONE (OUTPATIENT)
Dept: VASCULAR LAB | Facility: MEDICAL CENTER | Age: 33
End: 2023-08-01
Payer: MEDICAID

## 2023-08-01 NOTE — TELEPHONE ENCOUNTER
[facility]    Pt called stating RLE has been swelling and pain has been increasing.  Erythema is difficult to tell as LLJOSE DANIEL has a recent VTE and is also warm to the touch.     She is concerned for another VTE (previously diagnosed with VTE in LLE).  While I think it's unlikely because she is already on OAC, her symptoms have been getting progressively worse.     Denies SOB or CP.    Suggest she is seen in ER for additional follow up given the nature of her symptoms are worsening.   Pt states she would like to wait 1-2 days to see if symptoms improve.    Salinas Conte, PharmD

## 2023-08-21 ENCOUNTER — DOCUMENTATION (OUTPATIENT)
Dept: VASCULAR LAB | Facility: MEDICAL CENTER | Age: 33
End: 2023-08-21
Payer: MEDICAID

## 2023-08-21 NOTE — PROGRESS NOTES
Patient called asking if she needed labs and ultrasound done prior to next f/u with [facility].    Based on previous notes from [facility] on 07/27.  Patient to cmp/ cbc hemogram ordered prior to next visit    Advised if patient is experiencing adverse s/s of leg swelling to go to the ER.  Sent message to [facility] pool.

## 2023-08-21 NOTE — PROGRESS NOTES
Faxed Dr Bloch lab orders to [facility]. (Fx#[phone])  Per request from patient.     Scanned fax confirmation to media.

## 2023-08-24 ENCOUNTER — TELEPHONE (OUTPATIENT)
Dept: VASCULAR LAB | Facility: MEDICAL CENTER | Age: 33
End: 2023-08-24
Payer: MEDICAID

## 2023-08-24 NOTE — TELEPHONE ENCOUNTER
[facility]    Pt reports [facility] did not get the lab requests.  She supplied a different fax number of [phone] that was used to send the orders today.     Salinas Conte, MaulikD

## 2023-09-13 NOTE — PROGRESS NOTES
VASCULAR MEDICINE CLINIC - INITIAL VISIT (ANTICOAGULATION)  09/14/23     Rina Maher is a 32 y.o. female who presents today for LOT.     Subjective    HPI:  Patient referred for evaluation and management of anticoagulation therapy.  She presented to the ER on 6/20/23 with LLE pain after having an u/s done.  U/s showed a LLE distal femoral DVT.  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  Is neg for LA, PTGM, protein c/s def, ACLA.  No prior hx of VTEs.  Maternal GM had blood clots.   Mother had a CVA.  Maternal aunt has a hx of two CVAs. She has an IVC filter.  Father takes OAC for AF.   Pt was gardening and sprained her left ankle 5/31/23.  Wore a boot 2 weeks.  Noted to have tenderness behind her left knee at her ortho f/u on 6/20/23 prompting an u/s.  Denies any prior surgeries, hospitalizations, medical illnesses, extended travel.  No recent covid infections.  No tobacco use.  On depo provera.  Has already discussed her DVT with her gynecologist and with shared decision-making decision made to continue with depo provera.  Has never been pregnant.  Denies any personal hx of malignancy.  Had a mammogram this year which was normal.  Last pap smear in Oct 2022.  Started on Eliquis 10 mg BID x 7 days then 5 mg BID.  Has been adherent to taking.  Cost is affordable.  She has occas nose bleeds.  Lasts a few minutes.  No problems with bleeding.  Avoids NSAIDs/ASA.  No ETOH.  No SOB or CP.  Still has left calf pain at times.  Aneudy lymphedema.   Resumed usual activities for most part.  Avoids prolonged sedentary periods.  BMI 49.5.  Has completed 3 mo of therapy.    Past Medical History:   Diagnosis Date    Allergic rhinitis 6/27/2011    Anxiety 5/13/2011    BMI 45.0-49.9, adult (HCC) 5/13/2011    BMI 50.0-59.9, adult (HCC) 5/13/2011    Chronic pain 06/24/2019    thoracic    Dysmenorrhea 3/25/2015    Heart burn     treated with omeprazole.     History of anemia     Hypertension     Migraine headache 3/25/2015    Obesity,  morbid (more than 100 lbs over ideal weight or BMI > 40) ([facility]) 8/30/2011    Pain     back     Psychiatric disorder     depression and anxiety    Sacral fracture ([facility]) 9/21/2012    Thoracic vertebral fracture ([facility]) 9/21/2012        Past Surgical History:   Procedure Laterality Date    ND LAP, CANDACE RESTRICT PROC, LONGITUDINAL GAS*  2/19/2020    Procedure: GASTRECTOMY, SLEEVE, LAPAROSCOPIC;  Surgeon: Abel Coleman M.D.;  Location: SURGERY [facility];  Service: General    ND LAP,BILIARY TRACT,UNLISTED  2/19/2020    Procedure: BIOPSY, LIVER, LAPAROSCOPIC;  Surgeon: Abel Coleman M.D.;  Location: SURGERY [facility];  Service: General    COLONOSCOPY  6/26/2019    Procedure: COLONOSCOPY;  Surgeon: Be Gould M.D.;  Location: SURGERY [facility];  Service: Gastroenterology    DENTAL EXTRACTION(S)  2017    molar    MASS EXCISION ORTHO  10/6/2011    Performed by CARLOS MACHUCA at SURGERY [facility]    DANY BY LAPAROSCOPY  10/21/2008    COLONOSCOPY  2003    MYRINGOTOMY  1990        Family History   Problem Relation Age of Onset    Hypertension Mother     Hyperlipidemia Mother     Hypertension Father     Heart Disease Other     Stroke Other     Cancer Other         Social History     Tobacco Use    Smoking status: Never    Smokeless tobacco: Never   Substance Use Topics    Alcohol use: No    Drug use: No        Current Outpatient Medications on File Prior to Visit   Medication Sig Dispense Refill    lisinopril (PRINIVIL) 10 MG Tab Take 10 mg by mouth every day.      acetaminophen (TYLENOL) 500 MG Tab Take 1,000 mg by mouth every evening.      Multiple Vitamins-Minerals (BARIATRIC FUSION) Chew Tab Chew 1 Tablet 3 times a day.      tizanidine (ZANAFLEX) 2 MG tablet Take 2 mg by mouth 2 times a day as needed. Indications: Muscle Spasticity, Musculoskeletal Pain      sumatriptan (IMITREX) 100 MG tablet Take 100 mg by mouth one time as needed for Migraine.      pregabalin (LYRICA) 50 MG capsule Take 150 mg  by mouth every evening.      Lactobacillus Rhamnosus, GG, (CULTURELLE PO) Take 1 Capsule by mouth every evening.      topiramate (TOPAMAX) 100 MG Tab Take  mg by mouth 2 times a day. Pt takes 100MG in AM and 50MG in the evening      hydroCHLOROthiazide (HYDRODIURIL) 12.5 MG tablet Take 12.5 mg by mouth every day.      fluticasone (FLONASE) 50 MCG/ACT nasal spray Spray 1 Spray in nose every bedtime.      sertraline (ZOLOFT) 100 MG Tab Take 200 mg by mouth at bedtime.      cetirizine (KLS ALLER-TRESSA) 10 MG Tab Take 10 mg by mouth every bedtime.      atenolol (TENORMIN) 25 MG Tab Take 25 mg by mouth every bedtime.      omeprazole (PRILOSEC) 20 MG delayed-release capsule Take 20 mg by mouth every morning.      MedroxyPROGESTERone Acetate (DEPO-PROVERA IM) Inject 1 Each into the shoulder, thigh, or buttocks every 3 months. Last dose 5/25/2023       No current facility-administered medications on file prior to visit.      Allergies:  Cat hair extract, Vicodin [hydrocodone-acetaminophen], Other environmental, Sulfa drugs, Tape, Hydrocodone-acetaminophen, and Tree nuts food allergy     DIET AND EXERCISE:  Diet: small frequent meals s/p gastric sleeve  Exercise: no regular exercise program     Review of Systems   Respiratory:  Negative for hemoptysis and shortness of breath.    Cardiovascular:  Positive for leg swelling. Negative for chest pain, palpitations and claudication.   Gastrointestinal:  Negative for blood in stool and melena.   Genitourinary:  Negative for hematuria.   Musculoskeletal:  Positive for joint pain. Negative for myalgias.        Rt knee pain for which she uses a cane   Neurological:  Negative for seizures and loss of consciousness.   Endo/Heme/Allergies:  Does not bruise/bleed easily.            Objective       Objective:     Vitals:    09/14/23 1426   BP: 98/65   Pulse: 93        Physical Exam     DATA REVIEW    Lab Results   Component Value Date/Time    CHOLSTRLTOT 157 05/22/2015 11:53 AM    LDL  "80 05/22/2015 11:53 AM    HDL 46 05/22/2015 11:53 AM    TRIGLYCERIDE 155 (H) 05/22/2015 11:53 AM       Lab Results   Component Value Date/Time    SODIUM 140 06/20/2023 05:45 PM    POTASSIUM 3.5 (L) 06/20/2023 05:45 PM    CHLORIDE 107 06/20/2023 05:45 PM    CO2 20 06/20/2023 05:45 PM    GLUCOSE 87 06/20/2023 05:45 PM    BUN 15 06/20/2023 05:45 PM    CREATININE 0.84 06/20/2023 05:45 PM     Lab Results   Component Value Date/Time    ALKPHOSPHAT 65 06/20/2023 05:45 PM    ASTSGOT 20 06/20/2023 05:45 PM    ALTSGPT 25 06/20/2023 05:45 PM    TBILIRUBIN 0.3 06/20/2023 05:45 PM       INR   Date Value Ref Range Status   06/20/2023 1.06 0.87 - 1.13 Final     Comment:     Reference range:  INR - Non-therapeutic Reference Range: 0.87-1.13  INR - Therapeutic Reference Range: 2.0-4.0       No results found for: \"POCINR\"     6/20/23 LLE venous duplex:  1.  Acute/subacute thrombus within the mid to distal left femoral vein and within the gastrocnemius vein.   2.  The left posterior tibial peroneal veins are not delineated.   3.  Nonocclusive thrombus within the mid to distal left greater saphenous vein.    Medical Decision Making:  Today's Assessment / Status / Plan:     1. Acute deep vein thrombosis (DVT) of femoral vein of left lower extremity (HCC)  US-EXTREMITY VENOUS LOWER UNILAT LEFT      2. Chronic anticoagulation  apixaban (ELIQUIS) 5mg Tab      3. Factor V Leiden (formerly [facility])             Indication for anticoagulation: LLE femoral DVT, hetero FVL.    Anti-Platelet/Anticoagulant Discussion:  Discussed the risks and benefits of OAC in this setting. Her DVT was femoral. Occurred after minor ortho injury. She has hetero FVL. We discussed continuing Eliquis for a full 6 mo of therapy and then deciding whether to stay on long term therapy. Ongoing risks aside from FVL is obesity. We discussed the option of repeat imaging to check for any residual or chronic thrombus. Fortunately she has tolerated Eliquis w/o any bleeding problems. Her " risk of bleeding is ~1% annually (HAS BLED score =0) which is low risk. After much discussion and shared decision-making, she will continue Eliquis for now. She does have concern for future VTEs and cannot take ASA, so she is leaning towards indef therapy. We will pursue f/u imaging. Stressed the importance of close surveillance for s/sx of abnormal/prolonged bleeding and we discussed when to seek immediate medical attention. Asked that she let all her providers know she has a hx of a DVT and that she takes Eliquis, michael if having any surgeries or hospitalizations. Continue to avoid tobacco, and prolonged periods of immobility. In general we recommend avoidance of all OCPs, however, she has already had a risk vs benefit discussion with her doctor. Recommend getting up and walking every 1-2 hours, doing foot pumps and wearing compression stockings during long travel. Also recommended regular compression stocking use during the day time (knee high 20-30 mmHg). Pt will discuss age appropriate cancer screenings with his PCP as a small % of VTE can be caused by an underlying malignancy. She was advised that any future pregnancy should be planned and that we may want to consider prophylactic anticoagulation during pregnancy. Advised to avoid pregnancy while taking Eliquis. She verbalizes understanding.    Anti-Coagulation Plan:  - continue taking Eliquis 5 mg by mouth twice daily  - get ultrasound done - I will call you with the result  - go to the ER for shortness of breath, chest pain, pain with deep inhalation, worsening leg swelling and/or pain in calf or leg   - avoid sedentary periods  - let all your providers know you take this medication  - don't stop this medication without permission from your doctor or our clinic  -  refills before you run out  - continue complete avoidance of tobacco products  - avoid hormonal therapies including estrogen or testosterone-containing meds, or raloxifene or tamoxifene  (commonly used for osteoporosis)  - avoid Aspirin and anti-inflammatories (eg. Advil, ibuprofen, Aleve, naproxen, etc) while anticoagulated   - avoid skiing or other dangerous activities to reduce risk of head injury and brain bleeds  - elevate legs as much as possible, use compression stockings/socks during the daytime and with extended travel  - if any bleeding lasting 30min without stopping, please seek care with your PCP, urgent care, or ED  - if having any invasive procedure, please make sure the doctor knows of your history of blood clots and current anticoagulation status  - recommended to see your PCP to discuss if you need age-appropriate cancer screenings as a small % of blood clots may be caused by an underlying malignancy  - reversal agents for most blood thinners are now available and used if you have major bleeding     Smoking: continue complete avoidance of all tobacco products     Physical Activity: goal is 30 min of moderate activity 5 times per week     Weight Management and Nutrition: Low sodium diet like the DASH diet.    Instructed to follow-up with PCP for remainder of adult medical needs: yes  We will partner with other provider in the management of established vascular disease and cardiometabolic risk factors    Studies to Be Obtained: lle venous duplex  Labs to Be Obtained: cbc, cmp/bmp every 6 mo while taking doac (due Feb 2024).    Follow up in: 3 months    Carmen DE LEON

## 2023-09-14 ENCOUNTER — ANTICOAGULATION VISIT (OUTPATIENT)
Dept: VASCULAR LAB | Facility: MEDICAL CENTER | Age: 33
End: 2023-09-14
Attending: INTERNAL MEDICINE
Payer: MEDICAID

## 2023-09-14 VITALS — SYSTOLIC BLOOD PRESSURE: 98 MMHG | HEART RATE: 93 BPM | DIASTOLIC BLOOD PRESSURE: 65 MMHG

## 2023-09-14 DIAGNOSIS — D68.51 FACTOR V LEIDEN (HCC): ICD-10-CM

## 2023-09-14 DIAGNOSIS — I82.412 ACUTE DEEP VEIN THROMBOSIS (DVT) OF FEMORAL VEIN OF LEFT LOWER EXTREMITY (HCC): ICD-10-CM

## 2023-09-14 DIAGNOSIS — Z79.01 CHRONIC ANTICOAGULATION: ICD-10-CM

## 2023-09-14 PROBLEM — I82.409 DEEP VEIN THROMBOSIS (HCC): Status: ACTIVE | Noted: 2023-09-14

## 2023-09-14 PROCEDURE — 99212 OFFICE O/P EST SF 10 MIN: CPT | Performed by: NURSE PRACTITIONER

## 2023-09-14 RX ORDER — LISINOPRIL 10 MG/1
10 TABLET ORAL DAILY
COMMUNITY

## 2023-09-14 ASSESSMENT — ENCOUNTER SYMPTOMS
BLOOD IN STOOL: 0
SEIZURES: 0
LOSS OF CONSCIOUSNESS: 0
SHORTNESS OF BREATH: 0
HEMOPTYSIS: 0
CLAUDICATION: 0
PALPITATIONS: 0
MYALGIAS: 0
BRUISES/BLEEDS EASILY: 0

## 2023-09-18 NOTE — TELEPHONE ENCOUNTER
Received Refill PA request via [facility]  for ELIQUIS 5MG TABLETS. (Quantity:180, Day Supply:90)     Insurance: [place] [employer]  Member ID:  [id number]  BIN: [id number]  PCN: [id number]  Group: [id number]     Ran Test claim via [place] & medication  unable to verify copay. Per insurance, RTS 10/04/2023. Pt previously filled at [id number] [id number] AT [facility] [address]. Will send Rx to Tomorrow.        MAURO Brown, PhT  Pharmacy Liaison  P: [phone]  9/18/2023 8:55 AM

## 2023-10-19 ENCOUNTER — DOCUMENTATION (OUTPATIENT)
Dept: VASCULAR LAB | Facility: MEDICAL CENTER | Age: 33
End: 2023-10-19

## 2023-10-19 ENCOUNTER — APPOINTMENT (OUTPATIENT)
Dept: RADIOLOGY | Facility: MEDICAL CENTER | Age: 33
End: 2023-10-19
Attending: NURSE PRACTITIONER
Payer: MEDICAID

## 2023-10-19 DIAGNOSIS — I82.412 ACUTE DEEP VEIN THROMBOSIS (DVT) OF FEMORAL VEIN OF LEFT LOWER EXTREMITY (HCC): ICD-10-CM

## 2023-10-19 PROCEDURE — 93971 EXTREMITY STUDY: CPT | Mod: LT

## 2023-10-20 NOTE — PROGRESS NOTES
Reviewed u/s result:       1.  No evidence of left lower extremity deep venous thrombosis.  2.  Previously seen thrombus is no longer definitely visualized.    Spoke with pt by phone to let her know her DVT has resolved. Cont Eliquis 5 mg BID. [facility]    Carmen DE LEON

## 2023-12-04 ENCOUNTER — TELEPHONE (OUTPATIENT)
Dept: VASCULAR LAB | Facility: MEDICAL CENTER | Age: 33
End: 2023-12-04
Payer: MEDICAID

## 2023-12-04 NOTE — TELEPHONE ENCOUNTER
Received VM from pt asking to fax labs to [facility] [address]  Faxed to the number below        Maulik Del ToroD

## 2023-12-18 ASSESSMENT — ENCOUNTER SYMPTOMS
SEIZURES: 0
CLAUDICATION: 0
PALPITATIONS: 0
BLOOD IN STOOL: 0
LOSS OF CONSCIOUSNESS: 0
SHORTNESS OF BREATH: 0
BRUISES/BLEEDS EASILY: 0
HEMOPTYSIS: 0

## 2023-12-19 ENCOUNTER — DOCUMENTATION (OUTPATIENT)
Dept: VASCULAR LAB | Facility: MEDICAL CENTER | Age: 33
End: 2023-12-19
Payer: MEDICAID

## 2023-12-19 NOTE — PROGRESS NOTES
Called and requested most recent lab work results for HALEY Fiore from [facility].    Pending response.    Landy Amaay, Med Ass't  [facility]  Ph. [phone]  Fx. [phone]

## 2023-12-20 ENCOUNTER — ANTICOAGULATION VISIT (OUTPATIENT)
Dept: VASCULAR LAB | Facility: MEDICAL CENTER | Age: 33
End: 2023-12-20
Attending: INTERNAL MEDICINE
Payer: MEDICAID

## 2023-12-20 VITALS — DIASTOLIC BLOOD PRESSURE: 69 MMHG | SYSTOLIC BLOOD PRESSURE: 101 MMHG | HEART RATE: 77 BPM

## 2023-12-20 DIAGNOSIS — D68.51 FACTOR V LEIDEN (HCC): ICD-10-CM

## 2023-12-20 DIAGNOSIS — I82.4Y9 DEEP VEIN THROMBOSIS (DVT) OF PROXIMAL LOWER EXTREMITY, UNSPECIFIED CHRONICITY, UNSPECIFIED LATERALITY (HCC): ICD-10-CM

## 2023-12-20 PROCEDURE — 3078F DIAST BP <80 MM HG: CPT | Performed by: NURSE PRACTITIONER

## 2023-12-20 PROCEDURE — 99212 OFFICE O/P EST SF 10 MIN: CPT | Performed by: NURSE PRACTITIONER

## 2023-12-20 PROCEDURE — 99214 OFFICE O/P EST MOD 30 MIN: CPT | Performed by: NURSE PRACTITIONER

## 2023-12-20 PROCEDURE — 3074F SYST BP LT 130 MM HG: CPT | Performed by: NURSE PRACTITIONER

## 2023-12-20 ASSESSMENT — ENCOUNTER SYMPTOMS: BACK PAIN: 1

## 2024-01-30 DIAGNOSIS — I82.4Y9 DEEP VEIN THROMBOSIS (DVT) OF PROXIMAL LOWER EXTREMITY, UNSPECIFIED CHRONICITY, UNSPECIFIED LATERALITY (HCC): ICD-10-CM

## 2024-01-30 DIAGNOSIS — D68.51 FACTOR V LEIDEN (HCC): ICD-10-CM

## 2024-02-07 DIAGNOSIS — D68.51 FACTOR V LEIDEN (HCC): ICD-10-CM

## 2024-02-07 DIAGNOSIS — Z79.01 CHRONIC ANTICOAGULATION: ICD-10-CM

## 2024-03-06 ASSESSMENT — ENCOUNTER SYMPTOMS
SHORTNESS OF BREATH: 0
BLOOD IN STOOL: 0
CLAUDICATION: 0
LOSS OF CONSCIOUSNESS: 0
SEIZURES: 0
HEMOPTYSIS: 0
PALPITATIONS: 0

## 2024-03-06 NOTE — PROGRESS NOTES
VASCULAR MEDICINE CLINIC - F/u VISIT (ANTICOAGULATION)  3/14/24    Rina Maher is a 32 y.o. female who presents today for f/u.     Subjective    HPI:w/ a hx of LLE distal femoral DVT and left ankle tanya and in the setting of hetero FVL.  F/u u/s in Oct showed resolution of DVT.  Taking Eliquis 2.5 mg BID.  Has been adherent to taking.  Cost is affordable.  She has occas nose bleeds which last 1-3 minutes.  Usually occurs with blowing her nose.  Does bruise easily but less since Eliquis dose reduced.  No other problems with bleeding.  Avoids NSAIDs/ASA.  On depo provera per SDM w/ gynecologist..  No current plans for pregnancy.  No ETOH.  No SOB .  Occas CP which she attributes to anxiety.  Has justice LE swelling w/ a hx of lymphedema.   Wears compression stockings 3-4 times per week.  Elevates her legs when she can.  Occas has bone type pain to her LEs.  She will discuss with her PCP at her upcoming visit.  Also follows with a pain .  Resumed usual activities for most part.  Avoids prolonged sedentary periods.  BMI 49.5.  Actively trying to lose wt.  Has an allergy to ASA.  Prefers to stay on OAC for fear of recurrent VTE.  Had labs done at [facility] in Jan 2024 (see media tab).    Past Medical History:   Diagnosis Date    Allergic rhinitis 6/27/2011    Anxiety 5/13/2011    BMI 45.0-49.9, adult ([facility]) 5/13/2011    BMI 50.0-59.9, adult ([facility]) 5/13/2011    Chronic pain 06/24/2019    thoracic    Dysmenorrhea 3/25/2015    Heart burn     treated with omeprazole.     History of anemia     Hypertension     Migraine headache 3/25/2015    Obesity, morbid (more than 100 lbs over ideal weight or BMI > 40) ([facility]) 8/30/2011    Pain     back     Psychiatric disorder     depression and anxiety    Sacral fracture ([facility]) 9/21/2012    Thoracic vertebral fracture ([facility]) 9/21/2012        Past Surgical History:   Procedure Laterality Date    MA LAP, CANDACE RESTRICT PROC, LONGITUDINAL GAS*  2/19/2020    Procedure: GASTRECTOMY,  SLEEVE, LAPAROSCOPIC;  Surgeon: Abel Coleman M.D.;  Location: SURGERY [facility];  Service: General    FL LAP,BILIARY TRACT,UNLISTED  2/19/2020    Procedure: BIOPSY, LIVER, LAPAROSCOPIC;  Surgeon: Abel Coleman M.D.;  Location: SURGERY [facility];  Service: General    COLONOSCOPY  6/26/2019    Procedure: COLONOSCOPY;  Surgeon: Be Gould M.D.;  Location: SURGERY [facility];  Service: Gastroenterology    DENTAL EXTRACTION(S)  2017    molar    MASS EXCISION ORTHO  10/6/2011    Performed by CARLOS MACHUCA at SURGERY [facility]    DANY BY LAPAROSCOPY  10/21/2008    COLONOSCOPY  2003    MYRINGOTOMY  1990        Family History   Problem Relation Age of Onset    Hypertension Mother     Hyperlipidemia Mother     Hypertension Father     Heart Disease Other     Stroke Other     Cancer Other         Social History     Tobacco Use    Smoking status: Never    Smokeless tobacco: Never   Substance Use Topics    Alcohol use: No    Drug use: No        Current Outpatient Medications on File Prior to Visit   Medication Sig Dispense Refill    lisinopril (PRINIVIL) 10 MG Tab Take 10 mg by mouth every day.      acetaminophen (TYLENOL) 500 MG Tab Take 1,000 mg by mouth every evening.      Multiple Vitamins-Minerals (BARIATRIC FUSION) Chew Tab Chew 1 Tablet 3 times a day.      tizanidine (ZANAFLEX) 2 MG tablet Take 2 mg by mouth 2 times a day as needed. Indications: Muscle Spasticity, Musculoskeletal Pain      sumatriptan (IMITREX) 100 MG tablet Take 100 mg by mouth one time as needed for Migraine.      pregabalin (LYRICA) 50 MG capsule Take 150 mg by mouth every evening.      Lactobacillus Rhamnosus, GG, (CULTURELLE PO) Take 1 Capsule by mouth every evening.      topiramate (TOPAMAX) 100 MG Tab Take  mg by mouth 2 times a day. Pt takes 100MG in AM and 50MG in the evening      hydroCHLOROthiazide (HYDRODIURIL) 12.5 MG tablet Take 12.5 mg by mouth every day.      fluticasone (FLONASE) 50 MCG/ACT nasal  spray Spray 1 Spray in nose every bedtime.      sertraline (ZOLOFT) 100 MG Tab Take 200 mg by mouth at bedtime.      cetirizine (KLS ALLER-TRESSA) 10 MG Tab Take 10 mg by mouth every bedtime.      atenolol (TENORMIN) 25 MG Tab Take 25 mg by mouth every bedtime.      omeprazole (PRILOSEC) 20 MG delayed-release capsule Take 20 mg by mouth every morning.      MedroxyPROGESTERone Acetate (DEPO-PROVERA IM) Inject 1 Each into the shoulder, thigh, or buttocks every 3 months. Last dose 5/25/2023       No current facility-administered medications on file prior to visit.      Allergies:  Cat hair extract, Vicodin [hydrocodone-acetaminophen], Other environmental, Sulfa drugs, Tape, Hydrocodone-acetaminophen, and Tree nuts food allergy     DIET AND EXERCISE:  Diet: small frequent meals s/p gastric sleeve  Exercise: no regular exercise program     Review of Systems   HENT:  Positive for nosebleeds.    Respiratory:  Negative for hemoptysis and shortness of breath.    Cardiovascular:  Positive for leg swelling. Negative for chest pain, palpitations and claudication.   Gastrointestinal:  Negative for blood in stool and melena.   Genitourinary:  Negative for hematuria.   Musculoskeletal:  Positive for joint pain and myalgias.   Neurological:  Negative for seizures and loss of consciousness.   Endo/Heme/Allergies:  Bruises/bleeds easily.            Objective       Objective:     Vitals:    03/14/24 0916   BP: 109/73   Pulse: 79          Physical Exam  Constitutional:       Appearance: She is obese.   Cardiovascular:      Rate and Rhythm: Normal rate.   Pulmonary:      Effort: Pulmonary effort is normal.   Musculoskeletal:      Right lower leg: Edema present.      Left lower leg: Edema present.      Comments: Aneudy non pitting lower extremity swelling from calf through ankle. Non tender with palpation. Crease line at ankle. Small broken capillaries noted to inner left shin region.   Skin:     General: Skin is warm and dry.      Comments:  "No discoloration, lesions or ulcers to justice LEs.   Neurological:      General: No focal deficit present.      Mental Status: She is alert.   Psychiatric:         Mood and Affect: Mood normal.         Behavior: Behavior normal.         Thought Content: Thought content normal.         Judgment: Judgment normal.          DATA REVIEW    Lab Results   Component Value Date/Time    CHOLSTRLTOT 157 05/22/2015 11:53 AM    LDL 80 05/22/2015 11:53 AM    HDL 46 05/22/2015 11:53 AM    TRIGLYCERIDE 155 (H) 05/22/2015 11:53 AM       Lab Results   Component Value Date/Time    SODIUM 140 06/20/2023 05:45 PM    POTASSIUM 3.5 (L) 06/20/2023 05:45 PM    CHLORIDE 107 06/20/2023 05:45 PM    CO2 20 06/20/2023 05:45 PM    GLUCOSE 87 06/20/2023 05:45 PM    BUN 15 06/20/2023 05:45 PM    CREATININE 0.84 06/20/2023 05:45 PM     Lab Results   Component Value Date/Time    ALKPHOSPHAT 65 06/20/2023 05:45 PM    ASTSGOT 20 06/20/2023 05:45 PM    ALTSGPT 25 06/20/2023 05:45 PM    TBILIRUBIN 0.3 06/20/2023 05:45 PM       INR   Date Value Ref Range Status   06/20/2023 1.06 0.87 - 1.13 Final     Comment:     Reference range:  INR - Non-therapeutic Reference Range: 0.87-1.13  INR - Therapeutic Reference Range: 2.0-4.0       No results found for: \"POCINR\"     6/20/23 LLE venous duplex:  1.  Acute/subacute thrombus within the mid to distal left femoral vein and within the gastrocnemius vein.   2.  The left posterior tibial peroneal veins are not delineated.   3.  Nonocclusive thrombus within the mid to distal left greater saphenous vein.    10/19/23 LLE venous duplex:  1.  No evidence of left lower extremity deep venous thrombosis.  2.  Previously seen thrombus is no longer definitely visualized.    Medical Decision Making:  Today's Assessment / Status / Plan:     1. Deep vein thrombosis (DVT) of proximal lower extremity, unspecified chronicity, unspecified laterality (HCC)  apixaban (ELIQUIS) 2.5mg Tab    CBC WITHOUT DIFFERENTIAL    Comp Metabolic Panel "      2. Factor V Leiden (HCC)  apixaban (ELIQUIS) 2.5mg Tab    CBC WITHOUT DIFFERENTIAL    Comp Metabolic Panel           Indication for anticoagulation: LLE femoral DVT after ankle sprain and immoblization, hetero FVL.    Anti-Platelet/Anticoagulant Discussion:  Discussed the risks and benefits of OAC in this setting. Her DVT was femoral. Occurred after minor ortho injury and in the setting of hetero FVL. F/u scan showed resolution of DVT. Ongoing risks aside from FVL is obesity. Fortunately she has tolerated Eliquis w/o any major bleeding problems. Her risk of bleeding is ~1% annually (HAS BLED score =0) which is low risk. She prefers to stay on OAC. Also, allergic to ASA. Seems to be doing well on extended dose Eliquis. After much discussion and w/ shared decision-making and pt preference, she will continue Eliquis 2.5 mg BID. Stressed the importance of close surveillance for s/sx of abnormal/prolonged bleeding. Let her know she can apply Vaseline to a qtip and gently apply to her nares once daily. Pt to gently blow her nose. Also asked that she let all her providers know she has a hx of a DVT and that she takes Eliquis, michael if having any surgeries or hospitalizations. Continue to avoid tobacco, and prolonged periods of immobility. In general we recommend avoidance of all hormones, however, she has already had a risk vs benefit discussion with her doctor for depo. Recommend getting up and walking every 1-2 hours, doing foot pumps and wearing compression stockings during long travel. Also recommended regular compression stocking use or wrapping ace bandage around her legs snuggly. Pt will discuss age appropriate cancer screenings with his PCP as a small % of VTE can be caused by an underlying malignancy. She was advised that any future pregnancy should be planned at which time we would need to switch to pregnancy safe anticoagulant. Advised to avoid pregnancy while taking Eliquis. She verbalizes  understanding.      Anti-Coagulation Plan:  - continue taking Eliquis 2.5 mg by mouth twice daily  - go to the ER for shortness of breath, chest pain, pain with deep inhalation, worsening leg swelling and/or pain in calf or leg   - avoid sedentary periods  - let all your providers know you take this medication  - don't stop this medication without permission from your doctor or our clinic  -  refills before you run out  - continue complete avoidance of tobacco products  - avoid hormonal therapies including estrogen or testosterone-containing meds, or raloxifene or tamoxifene (commonly used for osteoporosis)  - avoid Aspirin and anti-inflammatories (eg. Advil, ibuprofen, Aleve, naproxen, etc) while anticoagulated   - avoid skiing or other dangerous activities to reduce risk of head injury and brain bleeds  - elevate legs as much as possible, use compression stockings/socks during the daytime and with extended travel  - if any bleeding lasting 30min without stopping, please seek care with your PCP, urgent care, or ED  - if having any invasive procedure, please make sure the doctor knows of your history of blood clots and current anticoagulation status  - recommended to see your PCP to discuss if you need age-appropriate cancer screenings as a small % of blood clots may be caused by an underlying malignancy  - reversal agents for most blood thinners are now available and used if you have major bleeding     Smoking: continue complete avoidance of all tobacco products     Physical Activity: goal is 30 min of moderate activity 5 times per week     Weight Management and Nutrition: Low sodium diet like the DASH diet.    Instructed to follow-up with PCP for remainder of adult medical needs: yes  We will partner with other provider in the management of established vascular disease and cardiometabolic risk factors    Studies to Be Obtained: none  Labs to Be Obtained: cbc, cmp/bmp every 6 mo while taking doac (to be done  prior to her next visit- given lab requisitions and faxed them to [facility] on [address].)    Follow up in: 6 months    Carmen DE LEON    Cc: Dr Syed

## 2024-03-14 ENCOUNTER — ANTICOAGULATION VISIT (OUTPATIENT)
Dept: VASCULAR LAB | Facility: MEDICAL CENTER | Age: 34
End: 2024-03-14
Attending: INTERNAL MEDICINE
Payer: MEDICAID

## 2024-03-14 ENCOUNTER — TELEPHONE (OUTPATIENT)
Dept: VASCULAR LAB | Facility: MEDICAL CENTER | Age: 34
End: 2024-03-14

## 2024-03-14 VITALS — HEART RATE: 79 BPM | SYSTOLIC BLOOD PRESSURE: 109 MMHG | DIASTOLIC BLOOD PRESSURE: 73 MMHG

## 2024-03-14 DIAGNOSIS — D68.51 FACTOR V LEIDEN (HCC): ICD-10-CM

## 2024-03-14 DIAGNOSIS — I82.4Y9 DEEP VEIN THROMBOSIS (DVT) OF PROXIMAL LOWER EXTREMITY, UNSPECIFIED CHRONICITY, UNSPECIFIED LATERALITY (HCC): ICD-10-CM

## 2024-03-14 PROCEDURE — 3074F SYST BP LT 130 MM HG: CPT | Performed by: NURSE PRACTITIONER

## 2024-03-14 PROCEDURE — 99212 OFFICE O/P EST SF 10 MIN: CPT | Performed by: NURSE PRACTITIONER

## 2024-03-14 PROCEDURE — 3078F DIAST BP <80 MM HG: CPT | Performed by: NURSE PRACTITIONER

## 2024-03-14 PROCEDURE — 99213 OFFICE O/P EST LOW 20 MIN: CPT | Performed by: NURSE PRACTITIONER

## 2024-03-14 ASSESSMENT — ENCOUNTER SYMPTOMS
BRUISES/BLEEDS EASILY: 1
MYALGIAS: 1

## 2024-03-14 NOTE — TELEPHONE ENCOUNTER
Received Refill PA request via [facility]  for ELIQUIS 2.5MG TABLETS. (Quantity:180, Day Supply:90)     Insurance: [place] [employer]  Member ID:  [id number]  BIN: [id number]  PCN: [id number]  Group: [id number]     Ran Test claim via [facility] medication Pays for a $0 30DS OR 90DS copay. Will outreach to patient to offer specialty pharmacy services and or release to preferred pharmacy    MAURO Brown, PhT  Vascular Pharmacy Liaison (Rx Coordinator)  P: [phone]  3/14/2024 2:22 PM

## 2024-07-12 DIAGNOSIS — D68.51 FACTOR V LEIDEN (HCC): ICD-10-CM

## 2024-07-12 DIAGNOSIS — I82.4Y9 DEEP VEIN THROMBOSIS (DVT) OF PROXIMAL LOWER EXTREMITY, UNSPECIFIED CHRONICITY, UNSPECIFIED LATERALITY (HCC): ICD-10-CM

## 2024-07-15 ENCOUNTER — DOCUMENTATION (OUTPATIENT)
Dept: VASCULAR LAB | Facility: MEDICAL CENTER | Age: 34
End: 2024-07-15
Payer: MEDICAID

## 2024-09-10 DIAGNOSIS — I82.4Y9 DEEP VEIN THROMBOSIS (DVT) OF PROXIMAL LOWER EXTREMITY, UNSPECIFIED CHRONICITY, UNSPECIFIED LATERALITY (HCC): ICD-10-CM

## 2024-09-10 DIAGNOSIS — D68.51 FACTOR V LEIDEN (HCC): ICD-10-CM

## 2024-09-10 RX ORDER — APIXABAN 2.5 MG/1
2.5 TABLET, FILM COATED ORAL 2 TIMES DAILY
Qty: 180 TABLET | Refills: 0 | Status: SHIPPED | OUTPATIENT
Start: 2024-09-10 | End: 2024-09-19

## 2024-09-16 ASSESSMENT — ENCOUNTER SYMPTOMS
PALPITATIONS: 0
HEMOPTYSIS: 0
SHORTNESS OF BREATH: 0
SEIZURES: 0
BRUISES/BLEEDS EASILY: 1
BLOOD IN STOOL: 0
MYALGIAS: 1
CLAUDICATION: 0
LOSS OF CONSCIOUSNESS: 0

## 2024-09-16 NOTE — PROGRESS NOTES
Diagnosis: LLE femoral DVT, hetero FVL  Drug: Eliquis 2.5 mg BID  LOT: Indefinite  CHADSVASC: n/a  HAS-BLED: 0    Health Status Since Last Assessment  Any new relevant medical problems, ED visits/hospitalizations - no  Any embolic events (stroke / TIA / systemic embolism) - no    Overall doing well. Taking Eliquis as instructed. No problems with bleeding though she does bruise easily. Still with justice LE swelling which comes and goes throughout the day. Compression stockings help.     Adherence with DOAC Therapy  0 missed dose(s)  BLEEDING RISK ASSESSMENT NB:    Bleeding Risk Assessment  Severe epistaxis - no   Hemoptysis - no  Excessive or unusual bruising / hematomas - no  GIB/melena/BRBPR/hematemesis - no  Hematuria - no   Abnormal vaginal bleeding - no  Concerning daily headache or subdural hematoma symptoms - no  Decreasing hemoglobin or new anemia - no  Falls, presyncope, syncope, or seizures - no  Platelets: 250  Latest hemoglobin:     Lab Results   Component Value Date/Time    WBC 7.6 06/20/2023 05:45 PM    RBC 5.10 06/20/2023 05:45 PM    HEMOGLOBIN 14.2 06/20/2023 05:45 PM    HEMATOCRIT 42.6 06/20/2023 05:45 PM    MCV 83.5 06/20/2023 05:45 PM    MCH 27.8 06/20/2023 05:45 PM    MCHC 33.3 06/20/2023 05:45 PM    MPV 10.2 06/20/2023 05:45 PM    NEUTSPOLYS 61.60 06/20/2023 05:45 PM    LYMPHOCYTES 27.70 06/20/2023 05:45 PM    MONOCYTES 8.10 06/20/2023 05:45 PM    EOSINOPHILS 1.70 06/20/2023 05:45 PM    BASOPHILS 0.40 06/20/2023 05:45 PM    HYPOCHROMIA 1+ 05/08/2013 08:03 AM        HAS-BLED:  Hypertension (uncontrolled, >160 mmHg systolic) - no  Renal disease (dialysis, transplant, Cr >2.26 mg/dL or >200 µmol/L) - no  Liver disease (cirrhosis or bilirubin >2x normal with AST/ALT/AP >3x normal) - no  Stroke history - no  Prior major bleeding or predisposition to bleeding - no  Labile INR Unstable/high INRs, time in therapeutic range <60% - n/a  Age >65 - no  Medication usage predisposing to bleeding (aspirin,  clopidogrel, NSAIDs) - avoids  Alcohol use  >=8 drinks/week - no      Creatinine Clearance/Renal Function  Latest eGFR / creatinine:  Lab Results   Component Value Date/Time    SODIUM 140 06/20/2023 05:45 PM    POTASSIUM 3.5 (L) 06/20/2023 05:45 PM    CHLORIDE 107 06/20/2023 05:45 PM    CO2 20 06/20/2023 05:45 PM    GLUCOSE 87 06/20/2023 05:45 PM    BUN 15 06/20/2023 05:45 PM    CREATININE 0.84 06/20/2023 05:45 PM       Is eGFR less than 50ml/min  no  Cr cl >50 ml/min    If YES, calculate CrCl (see back)  Any recent dehydrating illness or medications added/changed? i.e. Diuretics      Drug Interactions  ASA/antiplatelets - avoids  NSAID - avoids  Other drug interactions - no interactions with doac per epic (Review med list / OTCs;)  Current Outpatient Medications on File Prior to Visit   Medication Sig Dispense Refill    eszopiclone (LUNESTA) 1 MG Tab tablet Take 1 mg by mouth at bedtime as needed for Insomnia.      lisinopril (PRINIVIL) 10 MG Tab Take 10 mg by mouth every day.      acetaminophen (TYLENOL) 500 MG Tab Take 1,000 mg by mouth every evening.      Multiple Vitamins-Minerals (BARIATRIC FUSION) Chew Tab Chew 1 Tablet 3 times a day.      tizanidine (ZANAFLEX) 2 MG tablet Take 2 mg by mouth 2 times a day as needed. Indications: Muscle Spasticity, Musculoskeletal Pain      sumatriptan (IMITREX) 100 MG tablet Take 100 mg by mouth one time as needed for Migraine.      pregabalin (LYRICA) 50 MG capsule Take 150 mg by mouth every evening.      Lactobacillus Rhamnosus, GG, (CULTURELLE PO) Take 1 Capsule by mouth every evening.      topiramate (TOPAMAX) 100 MG Tab Take  mg by mouth 2 times a day. Pt takes 100MG in AM and 50MG in the evening      hydroCHLOROthiazide (HYDRODIURIL) 12.5 MG tablet Take 12.5 mg by mouth every day.      fluticasone (FLONASE) 50 MCG/ACT nasal spray Spray 1 Spray in nose every bedtime.      sertraline (ZOLOFT) 100 MG Tab Take 200 mg by mouth at bedtime.      cetirizine (KLS  ALLER-TRESSA) 10 MG Tab Take 10 mg by mouth every bedtime.      atenolol (TENORMIN) 25 MG Tab Take 25 mg by mouth every bedtime.      omeprazole (PRILOSEC) 20 MG delayed-release capsule Take 20 mg by mouth every morning.      MedroxyPROGESTERone Acetate (DEPO-PROVERA IM) Inject 1 Each into the shoulder, thigh, or buttocks every 3 months. Last dose 5/25/2023       No current facility-administered medications on file prior to visit.     Verified no anticonvulsant or azole therapy, education provided for future use.      Examination  Blood pressure:  131/75   Elevated BP - no (sBP greater than 160 mmHg)   Symptomatic hypotension - no  Significant gait impairment / imbalance / high risk for falls - no obvious risks    Final Assessment and Recommendations:  Patient appears stable from the anticoagulation standpoint  Benefits of continued DOAC therapy outweigh risks for this patient  Recommend continue current DOAC at same dose  Pt continues to feel more comfortable staying on extended dose Eliquis due to worries about recurrent VTEs.  She is tolerating well and has a low bleed risk (HAS BLED score = 0).   Copay affordable.  Discussed option to see a lymphedema specialist for her justice LE swelling which she is interested in. Referral placed.  Pt request to have PCP manage OAC moving forward however she needs to discuss with her PCP.  Offered to schedule pt for 1 year follow up with me but she will discuss with her PCP first and let me know if she needs a visit with me if her PCP doesn't want to manage OAC.    - Continue taking Eliquis 2.5 mg by mouth twice daily    Other Actions:   The rationale for continued DOAC therapy  The potential for minor, major or life-threatening bleeding  Dosing instructions, adherence, risks of non-adherence, handling missed doses  Avoiding OTC ASA & NSAIDs & minimizing EtOH to reduce bleeding risks  Dosing around upcoming procedure / surgery if applicable (see back)    Follow up:  Will follow up  with patient in 1 year - pt will call to schedule.    Carmen DE LEON

## 2024-09-19 ENCOUNTER — ANTICOAGULATION VISIT (OUTPATIENT)
Dept: VASCULAR LAB | Facility: MEDICAL CENTER | Age: 34
End: 2024-09-19
Attending: INTERNAL MEDICINE
Payer: MEDICAID

## 2024-09-19 VITALS — SYSTOLIC BLOOD PRESSURE: 131 MMHG | DIASTOLIC BLOOD PRESSURE: 75 MMHG | HEART RATE: 80 BPM

## 2024-09-19 DIAGNOSIS — D68.51 FACTOR V LEIDEN (HCC): ICD-10-CM

## 2024-09-19 DIAGNOSIS — R60.0 BILATERAL LOWER EXTREMITY EDEMA: ICD-10-CM

## 2024-09-19 DIAGNOSIS — I82.4Y9 DEEP VEIN THROMBOSIS (DVT) OF PROXIMAL LOWER EXTREMITY, UNSPECIFIED CHRONICITY, UNSPECIFIED LATERALITY (HCC): ICD-10-CM

## 2024-09-19 PROCEDURE — 99212 OFFICE O/P EST SF 10 MIN: CPT | Performed by: NURSE PRACTITIONER

## 2024-09-19 RX ORDER — ESZOPICLONE 1 MG/1
1 TABLET, FILM COATED ORAL
COMMUNITY

## 2024-12-06 DIAGNOSIS — I82.4Y9 DEEP VEIN THROMBOSIS (DVT) OF PROXIMAL LOWER EXTREMITY, UNSPECIFIED CHRONICITY, UNSPECIFIED LATERALITY (HCC): ICD-10-CM

## 2024-12-06 DIAGNOSIS — D68.51 FACTOR V LEIDEN (HCC): ICD-10-CM

## 2025-01-20 ENCOUNTER — PHYSICAL THERAPY (OUTPATIENT)
Dept: PHYSICAL THERAPY | Facility: REHABILITATION | Age: 35
End: 2025-01-20
Attending: NURSE PRACTITIONER
Payer: MEDICAID

## 2025-01-20 DIAGNOSIS — R60.0 BILATERAL LOWER EXTREMITY EDEMA: ICD-10-CM

## 2025-01-20 DIAGNOSIS — R60.9 LIPEDEMA: ICD-10-CM

## 2025-01-20 PROCEDURE — 97163 PT EVAL HIGH COMPLEX 45 MIN: CPT

## 2025-01-20 NOTE — OP THERAPY EVALUATION
"  Outpatient Physical Therapy  LYMPHEDEMA THERAPY INITIAL EVALUATION    [facility] Physical Therapy [facility]  [address]  [address]  [facility] [address]  Phone:  [phone]  Fax:  [phone]    Date of Evaluation: 01/20/2025    Patient: Rina Maher  YOB: 1990  MRN: [id number]     Referring Provider: JUAN MIGUEL Ovalles  [address] [place] [address]   Referring Diagnosis Deep vein thrombosis (DVT) of proximal lower extremity, unspecified chronicity, unspecified laterality (Formerly [facility]) [I82.4Y9];Bilateral lower extremity edema [R60.0]     Time Calculation    Start time: 0946  Stop time: 1033 Time Calculation (min): 47 minutes             Chief Complaint: Lipedema    Visit Diagnoses     ICD-10-CM   1. Bilateral lower extremity edema  R60.0   2. Lipedema  R60.9       Subjective:   History of Present Illness:     Mechanism of injury:  Pt reports that as long as she can remember her legs have always been puffy. Likely since childhood. Her aunt also has a similar issue. Two years ago, her L LE had four blood clots and her R LE has 20% nerve damage due low back issues. Feet aren't puffy, they're like normal feet. Feels the same way about her arms. Legs do not seem to be sensitive but are achy by day's end. Knees are quite achy. The sides of her legs are very painful but feels this is related to sciatica. Shins feel as though someone is grabbing them. Does have fluid fluctuations and increases by day's end. Legs are \"veiny\" as in spider and varicose. Has worn compression. Hard to find compression that fits, however.       Past Medical History:   Diagnosis Date    Allergic rhinitis 6/27/2011    Anxiety 5/13/2011    BMI 45.0-49.9, adult (Formerly [facility]) 5/13/2011    BMI 50.0-59.9, adult (Formerly [facility]) 5/13/2011    Chronic pain 06/24/2019    thoracic    Dysmenorrhea 3/25/2015    Heart burn     treated with omeprazole.     History of anemia     Hypertension     Migraine headache 3/25/2015    Obesity, morbid (more " than 100 lbs over ideal weight or BMI > 40) (Formerly [facility]) 8/30/2011    Pain     back     Psychiatric disorder     depression and anxiety    Sacral fracture (Formerly [facility]) 9/21/2012    Thoracic vertebral fracture (Formerly [facility]) 9/21/2012     Past Surgical History:   Procedure Laterality Date    CA LAP, CANDACE RESTRICT PROC, LONGITUDINAL GAS*  2/19/2020    Procedure: GASTRECTOMY, SLEEVE, LAPAROSCOPIC;  Surgeon: Abel Coleman M.D.;  Location: SURGERY [facility];  Service: General    CA LAP,BILIARY TRACT,UNLISTED  2/19/2020    Procedure: BIOPSY, LIVER, LAPAROSCOPIC;  Surgeon: Abel Coleman M.D.;  Location: SURGERY [facility];  Service: General    COLONOSCOPY  6/26/2019    Procedure: COLONOSCOPY;  Surgeon: Be Gould M.D.;  Location: SURGERY [facility];  Service: Gastroenterology    DENTAL EXTRACTION(S)  2017    molar    MASS EXCISION ORTHO  10/6/2011    Performed by CARLOS MACHUCA at SURGERY [facility]    DANY BY LAPAROSCOPY  10/21/2008    COLONOSCOPY  2003    MYRINGOTOMY  1990     Social History     Tobacco Use    Smoking status: Never    Smokeless tobacco: Never   Substance Use Topics    Alcohol use: No     Family and Occupational History     Socioeconomic History    Marital status: Single     Spouse name: Not on file    Number of children: Not on file    Years of education: Not on file    Highest education level: Not on file   Occupational History    Not on file       Lymphedema Objective    Visit type  Lipedema    Left Lower Extremity Circumferential Measurements  Waist: cm  Hip: cm  Scrotum: cm  Ground/Upper Thigh: 82.7 cm  Mid Thigh: 70.1 cm  Knee: 54.8 cm  Upper Calf: 63.1 cm  Mid Calf: 45.2 cm  Ankle: 34.8 (over the cuff) cm  Heel to Foot: 22.2 cm  Total: 372.9 cm    Right Lower Extremity Circumferential Measurements  Waist: cm  Hip: cm  Scrotum: cm  Ground/Upper Thigh: 82.5 cm  Mid Thigh: 72.1 cm  Knee: 51.5 cm  Upper Calf: 66.4 cm  Mid Calf: 48.2 cm  Ankle: 35.2 cm  Heel to Foot: 21.8 cm  Total: 377.7  "cm    Other Notes  71cm        Therapeutic Treatments and Modalities:     Therapeutic Treatment and Modalities Summary: Pt has been educated on the pathogenesis of lipedema (fluid in fat) including the following: lipedema is a symmetrical, painful fat disorder affecting subcutaneous adipose tissue mostly in the hips, buttocks, and legs. Often, this fat accumulation does not respond to traditional weight loss strategies of diet and exercise. While the cause is not completely understood, current thinking is that hormonal changes impact the quality of blood vessels and lymphatics such that they become leaky. It is therefore common to feel as though the limb is painful and heavy due to the accumulation of fat and fluid. In addition, sometimes limbs can easily bruise. Informational brochure distributed.      Educated patient on pathogenesis of lymphedema and discussed the need for external compression and educated pt regarding compression garment options.  Patient was educated the optimal use of garment (all day, every day, especially during higher risk activities as discussed, remove at night). Patient verbalized understanding to all education today.     Time-based treatments/modalities:           Assessment and Plan:   Functional Impairments: hypersensitivity, lacks appropriate home exercise program and swelling    Assessment details:  Rina is a 33yo F with report of fullness to her B LE that extends from her hips to her ankle, sparing her feet. In addition, she reports her arms are also full but hands seem \"normal\" to her. Given her presentation and skin texture on palpation, suspect Rina to have symptoms of lipedema as noted above. It does seem as though her feet only intermittently swell which is a positive indication that she has not yet transitioned to lipolymphedema. She has been educated on pathogenesis of both and compression garments including leggings and arm sleeves have been discussed. She will " benefit from skilled intervention to assist with controlling fluid accumulation to her B LE and B UE.   Prognosis: fair      Goals:   Short Term Goals:  1.  Patient will obtain a compression garment for her B UE and LE to allow for fluid removal from legs and arms.  2. Pt will achieve a total limb circumference reduction of 5cm in order to decrease risk for infection and progression of disease process.  3. Pt will be independent with self-MLD to facilitate fluid migration to healthy tissues and lymph nodes.   4 Pt will consistently perform exercises with compression on to facilitate muscle pump of fluid from affected extremity.       Short term goal time span:  2-4 weeks    Long Term Goals:  1. Pt will have a reduction in total limb circumference of 10cm in order to decrease risk for infection and progression of disease process.   2. Patient will be independent with management of lipedema including: compression garments, skin care education, risk reduction strategies, manual lymphatic drainage, and therapeutic exercise.   Long term goal time span:  4-6 weeks    Plan:  Therapy options:  Physical therapy treatment to continue  Planned therapy interventions:  Compression sleeve, caregiver education, compression stocking, decongestive exercises, home exercise program, intermittent compression, manual lymph drainage, Velcro wraps, strengthening exercises, skin/wound care, short stretch bandages, sequential compression pump, self-care/training (CPT 62601), referral for compression garment & instructions for don/doffing, postural exercises, range of motion exercises, patient education, orthotic measurements/fitting and myofascial release techniques  Planned education:  Functional anatomy and physiology of the lymphatic system, pathophysiology of lymphedema, lymphedema exercise, lymphedema precautions, proper skin care/nutrition, compression bandaging, self massage, infection prevention, scar tissue management, activity  guidelines, dietary guidelines, skin care guidelines, bandage removal, home pump use and long term self-management of lymphedema  Frequency:  1x week  Duration in weeks:  8  Discussed with:  Patient      Functional Assessment Used    LLIS    Referring provider co-signature:  I have reviewed this plan of care and my co-signature certifies the need for services.    Certification Period: 01/20/2025 to  03/17/25    Physician Signature: ________________________________ Date: ______________

## 2025-01-27 ENCOUNTER — PHYSICAL THERAPY (OUTPATIENT)
Dept: PHYSICAL THERAPY | Facility: REHABILITATION | Age: 35
End: 2025-01-27
Attending: NURSE PRACTITIONER
Payer: MEDICAID

## 2025-01-27 DIAGNOSIS — R60.0 BILATERAL LOWER EXTREMITY EDEMA: ICD-10-CM

## 2025-01-27 DIAGNOSIS — R60.9 LIPEDEMA: ICD-10-CM

## 2025-01-27 PROCEDURE — 97535 SELF CARE MNGMENT TRAINING: CPT

## 2025-01-27 NOTE — OP THERAPY DISCHARGE SUMMARY
Outpatient Physical Therapy  DISCHARGE SUMMARY NOTE      [facility] Physical Therapy [facility]  [address]  [address]  [facility] [address]  Phone:  [phone]  Fax:  [phone]    Date of Visit: 01/27/2025    Patient: Rina Maher  YOB: 1990  MRN: [id number]     Referring Provider: JUAN MIGUEL Ovalles  [address] [place] [address]   Referring Diagnosis Acute embolism and thrombosis of unspecified deep veins of unspecified proximal lower extremity [I82.4Y9];Localized edema [R60.0]         Functional Assessment Used    LLIS    Your patient is being discharged from Physical Therapy with the following comments:   Goals met    Comments:  Neil has reduced her B LE circumferences by ~10cm each since introducing compression. She has also been instructed on self MLD and deep breathing to assist with lymphatic flow. At this time, she would like to be DC'd to her home program and will reach out should she have progression or worsening fluid retention. Jerzy MAST.     Shannen Valerio, PT    Date: 1/27/2025

## 2025-01-27 NOTE — OP THERAPY DAILY TREATMENT
Outpatient Physical Therapy  LYMPHEDEMA THERAPY DAILY TREATMENT     [facility] Physical Therapy [address]  [address] [facility]  [place] [address]  Phone:  [phone]  Fax:  [phone]    Date: 01/27/2025    Patient: Rina Maher  YOB: 1990  MRN: [id number]     Time Calculation    Start time: 0902  Stop time: 0948 Time Calculation (min): 46 minutes         Chief Complaint: Lipolymphedema    Visit #: 2    Subjective:   History of Present Illness:     Mechanism of injury:  Received her Bioflect arm sleeves and Compression Z leggings      Lymphedema Objective      Left Upper Extremity Circumferential Measurements  Other: cm  Areola: cm  Breast Crease: cm  Axilla: 45 cm  Upper Proximal Humerus: 47.5 (widest humerus) cm  Distal Humerus: 46.7 cm  Elbow: 29.3 cm  Mid-Forearm: 30.3 cm  Wrist: 17.9 cm  Distal Winston Crease: 18.5 cm  Total: 235.2 cm    Right Upper Extremity Circumferential Measurements  Other: cm  Areola: cm  Breast Crease: cm  Axilla: 45.4 cm  Upper Proximal Humerus: 47.8 cm  Distal Humerus: 41.3 cm  Elbow: 29.5 cm  Mid-Forearm: 27.6 cm  Wrist: 17.2 cm  Distal Winston Crease: 18.7 cm  Total: cm 227.5    Left Lower Extremity Circumferential Measurements  Waist: cm  Hip: cm  Scrotum: cm  Ground/Upper Thigh: 76.2 cm  Mid Thigh: 67.2 cm  Knee: 54.4 cm  Upper Calf: 59.8 cm  Mid Calf: 45 cm  Ankle: 35.3 cm  Heel to Foot: 22.2 cm  Total: 360.1 cm    Right Lower Extremity Circumferential Measurements  Waist: cm  Hip: cm  Scrotum: cm  Ground/Upper Thigh: 76.3 cm  Mid Thigh: 67.3 cm  Knee: 54.6 cm  Upper Calf: 60.2 cm  Mid Calf: 47.4 cm  Ankle: 35.2 cm  Heel to Foot: 21.8 cm  Total: 362.8 cm        Left Lower Extremity Circumferential Measurements EVAL  Waist: cm  Hip: cm  Scrotum: cm  Ground/Upper Thigh: 82.7 cm  Mid Thigh: 70.1 cm  Knee: 54.8 cm  Upper Calf: 63.1 cm  Mid Calf: 45.2 cm  Ankle: 34.8 (over the cuff) cm  Heel to Foot: 22.2 cm  Total: 372.9 cm     Right Lower Extremity  Circumferential Measurements EVAL  Waist: cm  Hip: cm  Scrotum: cm  Ground/Upper Thigh: 82.5 cm  Mid Thigh: 72.1 cm  Knee: 51.5 cm  Upper Calf: 66.4 cm  Mid Calf: 48.2 cm  Ankle: 35.2 cm  Heel to Foot: 21.8 cm  Total: 377.7 cm    Therapeutic Treatments and Modalities:     1. Self Care ADL Training (CPT 44493)    Therapeutic Treatment and Modalities Summary:   -discussed Compression Z leggings versus Bioflect. Pt would like to utilize her Compression Z and Bioflect arm pieces.   -Patient was educated in regular, self MLD of bilateral lower extremity utilizing inguinal lymph nodes, popliteal, and femoral nodes with associated pathways.  Correct strokes were emphasized and handout was provided.  Patient was invited to return for in clinic, skilled physical therapist administered MLD for which patient will set an additional appointment.   The purpose of Manual Lymph Drainage (MLD) is to reduce lymph volume in the affected limb by increasing intake of lymphatic load into the lymphatic system, increasing the volume of transported lymph fluid, moving lymph fluid in superficial lymph vessels to collateral lymph collectors, anastomoses, or tissue channels, and increasing venous return. The goal of MLD is to re-route the lymph flow around blocked areas into more centrally located healthy lymph vessels, which drain into the venous system.      Time-based treatments/modalities:    Physical Therapy Timed Treatment Charges  Functional training, self care minutes (CPT 29275): 46 minutes      Assessment and Plan:   Assessment details:  Neil has reduced her B LE circumferences by ~10cm each since introducing compression. She has also been instructed on self MLD and deep breathing to assist with lymphatic flow. At this time, she would like to be DC'd to her home program and will reach out should she have progression or worsening fluid retention. Will DC.     Plan:  Therapy options:  No further treatment needed  Discussed with:   Patient

## 2025-01-30 ENCOUNTER — APPOINTMENT (OUTPATIENT)
Dept: PHYSICAL THERAPY | Facility: REHABILITATION | Age: 35
End: 2025-01-30
Attending: NURSE PRACTITIONER
Payer: MEDICAID

## 2025-02-03 ENCOUNTER — APPOINTMENT (OUTPATIENT)
Dept: PHYSICAL THERAPY | Facility: REHABILITATION | Age: 35
End: 2025-02-03
Attending: NURSE PRACTITIONER
Payer: MEDICAID

## 2025-02-06 ENCOUNTER — APPOINTMENT (OUTPATIENT)
Dept: PHYSICAL THERAPY | Facility: REHABILITATION | Age: 35
End: 2025-02-06
Attending: NURSE PRACTITIONER
Payer: MEDICAID

## 2025-02-10 ENCOUNTER — APPOINTMENT (OUTPATIENT)
Dept: PHYSICAL THERAPY | Facility: REHABILITATION | Age: 35
End: 2025-02-10
Attending: NURSE PRACTITIONER
Payer: MEDICAID

## 2025-02-13 ENCOUNTER — APPOINTMENT (OUTPATIENT)
Dept: PHYSICAL THERAPY | Facility: REHABILITATION | Age: 35
End: 2025-02-13
Attending: NURSE PRACTITIONER
Payer: MEDICAID

## 2025-02-17 ENCOUNTER — APPOINTMENT (OUTPATIENT)
Dept: PHYSICAL THERAPY | Facility: REHABILITATION | Age: 35
End: 2025-02-17
Attending: NURSE PRACTITIONER
Payer: MEDICAID

## 2025-02-20 ENCOUNTER — APPOINTMENT (OUTPATIENT)
Dept: PHYSICAL THERAPY | Facility: REHABILITATION | Age: 35
End: 2025-02-20
Attending: NURSE PRACTITIONER
Payer: MEDICAID

## 2025-02-24 ENCOUNTER — APPOINTMENT (OUTPATIENT)
Dept: PHYSICAL THERAPY | Facility: REHABILITATION | Age: 35
End: 2025-02-24
Attending: NURSE PRACTITIONER
Payer: MEDICAID

## 2025-02-27 ENCOUNTER — APPOINTMENT (OUTPATIENT)
Dept: PHYSICAL THERAPY | Facility: REHABILITATION | Age: 35
End: 2025-02-27
Attending: NURSE PRACTITIONER
Payer: MEDICAID

## 2025-05-29 ENCOUNTER — TELEPHONE (OUTPATIENT)
Dept: VASCULAR LAB | Facility: MEDICAL CENTER | Age: 35
End: 2025-05-29
Payer: MEDICAID

## 2025-05-29 NOTE — TELEPHONE ENCOUNTER
S/w pt regarding DOAC f/u - her PCP will manage her DOAC.  Pt discharged from [facility]  Allison Vang, MaulikD

## (undated) DEVICE — SENSOR SPO2 NEO LNCS ADHESIVE (20/BX) SEE USER NOTES

## (undated) DEVICE — ELECTRODE 850 FOAM ADHESIVE - HYDROGEL RADIOTRNSPRNT (50/PK)

## (undated) DEVICE — GLOVE SZ 7 BIOGEL PI MICRO - PF LF (50PR/BX 4BX/CA)

## (undated) DEVICE — TROCAR 5X100 NON BLADED Z-TH - READ KII (6/BX)

## (undated) DEVICE — NEPTUNE 4 PORT MANIFOLD - (20/PK)

## (undated) DEVICE — NEEDLE MONOPTY 14GAX16CM - (10EA/CA)

## (undated) DEVICE — PROTECTOR ULNA NERVE - (36PR/CA)

## (undated) DEVICE — GOLD PROBE 7FR (5/BX)

## (undated) DEVICE — CANNULA W/ SUPPLY TUBING O2 - (50/CA)

## (undated) DEVICE — KIT CUSTOM PROCEDURE SINGLE FOR ENDO  (15/CA)

## (undated) DEVICE — TROCAR SEPARATOR 15MMZTHREAD - (6/BX)

## (undated) DEVICE — CANISTER SUCTION 3000ML MECHANICAL FILTER AUTO SHUTOFF MEDI-VAC NONSTERILE LF DISP  (40EA/CA)

## (undated) DEVICE — RELOAD WITH GRIPPING SURGACE TECHNOLOGY GREEN 60MM (12EA/BX)

## (undated) DEVICE — RELOAD WITH GRIPPING SURFACE TECHNOLOGY GOLD 60MM (12EA/BX)

## (undated) DEVICE — SET EXTENSION WITH 2 PORTS (48EA/CA) ***PART #2C8610 IS A SUBSTITUTE*****

## (undated) DEVICE — CANNULA W/SEAL 5X100 Z-THRE - ADED KII (12/BX)

## (undated) DEVICE — SLEEVE, VASO, THIGH, MED

## (undated) DEVICE — BAG RETRIEVAL 12/15 MM INZII (5EA/CA) THIS WILL REPLACE ITEM 75018

## (undated) DEVICE — TUBING CLEARLINK DUO-VENT - C-FLO (48EA/CA)

## (undated) DEVICE — DRESSING NON ADHERENT 3 X 4 - STERILE (100/BX 12BX/CA)

## (undated) DEVICE — SCISSORS 5MM CVD (6EA/BX)

## (undated) DEVICE — APPLICATOR DUPLO SPRAYER (5EA/CA)

## (undated) DEVICE — HEAD HOLDER JUNIOR/ADULT

## (undated) DEVICE — MASK ANESTHESIA ADULT  - (100/CA)

## (undated) DEVICE — SUTURE2-0 27IN VCRL ANTI VIOL (36PK/BX)

## (undated) DEVICE — KIT ANESTHESIA W/CIRCUIT & 3/LT BAG W/FILTER (20EA/CA)

## (undated) DEVICE — CLIP APPLIER 10MM ENDO LARGE (3EA/BX)

## (undated) DEVICE — SPEEDBAND SUPERVIEW SUPER 7 (4/BX)

## (undated) DEVICE — GLOVE BIOGEL PI INDICATOR SZ 7.0 SURGICAL PF LF - (50/BX 4BX/CA)

## (undated) DEVICE — CATHETER IV SAFETY 20 GA X 1-1/4 (50/BX)

## (undated) DEVICE — PERISTRIP 60 STAPLE LINE REINFORCEMENT (6EA/CA)

## (undated) DEVICE — SYSTEM CALIBARATION  GASTRECTOMY 40FR (5/BX)

## (undated) DEVICE — GLOVE, LITE (PAIR)

## (undated) DEVICE — GLOVE SZ 6.5 BIOGEL PI MICRO - PF LF (50PR/BX)

## (undated) DEVICE — GOWN WARMING STANDARD FLEX - (30/CA)

## (undated) DEVICE — HANDPIECE THUNDERBEAT 5MM 35CM FRONT GRIP TYPE S (5EA/BX)

## (undated) DEVICE — CHLORAPREP 26 ML APPLICATOR - ORANGE TINT(25/CA)

## (undated) DEVICE — ELECTRODE DUAL RETURN W/ CORD - (50/PK)

## (undated) DEVICE — SUTURE GENERAL

## (undated) DEVICE — SODIUM CHL IRRIGATION 0.9% 1000ML (12EA/CA)

## (undated) DEVICE — KIT ROOM DECONTAMINATION

## (undated) DEVICE — SUTURE 4-0 VICRYL PLUSFS-1 - 27 INCH (36/BX)

## (undated) DEVICE — GLOVE BIOGEL PI INDICATOR SZ 7.5 SURGICAL PF LF -(50/BX 4BX/CA)

## (undated) DEVICE — TISSEEL 4ML ----MUST ORDER A MIN OF 6EA----

## (undated) DEVICE — LACTATED RINGERS INJ 1000 ML - (14EA/CA 60CA/PF)

## (undated) DEVICE — RELOAD WITH GRIPPING SURFACE TECHNOLOGY BLUE 60MM (12EA/BX)

## (undated) DEVICE — SUCTION INSTRUMENT YANKAUER BULBOUS TIP W/O VENT (50EA/CA)

## (undated) DEVICE — SUTURE 0 LIGATING REEL VICRYL PLUS (12PK/BX)

## (undated) DEVICE — STAPLER POWERED 60MM (3EA/BX)

## (undated) DEVICE — SET LEADWIRE 5 LEAD BEDSIDE DISPOSABLE ECG (1SET OF 5/EA)

## (undated) DEVICE — PACK GASTRIC BANDING OR - (1/CA)